# Patient Record
Sex: MALE | Race: WHITE | Employment: UNEMPLOYED | URBAN - METROPOLITAN AREA
[De-identification: names, ages, dates, MRNs, and addresses within clinical notes are randomized per-mention and may not be internally consistent; named-entity substitution may affect disease eponyms.]

---

## 2018-08-23 ENCOUNTER — HOSPITAL ENCOUNTER (OUTPATIENT)
Dept: GENERAL RADIOLOGY | Age: 58
Discharge: HOME | End: 2018-08-23
Payer: COMMERCIAL

## 2018-08-23 ENCOUNTER — HOSPITAL ENCOUNTER (OUTPATIENT)
Age: 58
Discharge: HOME | End: 2018-08-23
Payer: COMMERCIAL

## 2018-08-23 DIAGNOSIS — M25.551 HIP PAIN, BILATERAL: ICD-10-CM

## 2018-08-23 DIAGNOSIS — M54.42 LEFT-SIDED LOW BACK PAIN WITH LEFT-SIDED SCIATICA, UNSPECIFIED CHRONICITY: ICD-10-CM

## 2018-08-23 DIAGNOSIS — M25.552 HIP PAIN, BILATERAL: ICD-10-CM

## 2018-08-23 PROCEDURE — 73521 X-RAY EXAM HIPS BI 2 VIEWS: CPT

## 2018-08-23 PROCEDURE — 72100 X-RAY EXAM L-S SPINE 2/3 VWS: CPT

## 2020-06-30 ENCOUNTER — HOSPITAL ENCOUNTER (OUTPATIENT)
Age: 60
Discharge: HOME | End: 2020-06-30
Payer: COMMERCIAL

## 2020-06-30 ENCOUNTER — HOSPITAL ENCOUNTER (OUTPATIENT)
Dept: GENERAL RADIOLOGY | Age: 60
Discharge: HOME | End: 2020-06-30
Payer: COMMERCIAL

## 2020-06-30 PROCEDURE — 73501 X-RAY EXAM HIP UNI 1 VIEW: CPT

## 2021-01-06 ENCOUNTER — OFFICE VISIT (OUTPATIENT)
Dept: ORTHOPEDIC SURGERY | Age: 61
End: 2021-01-06
Payer: COMMERCIAL

## 2021-01-06 VITALS — WEIGHT: 191 LBS | HEIGHT: 70 IN | BODY MASS INDEX: 27.35 KG/M2

## 2021-01-06 DIAGNOSIS — M25.522 LEFT ELBOW PAIN: ICD-10-CM

## 2021-01-06 DIAGNOSIS — M75.22 BICEPS TENDINITIS OF LEFT UPPER EXTREMITY: Primary | ICD-10-CM

## 2021-01-06 PROCEDURE — 99203 OFFICE O/P NEW LOW 30 MIN: CPT | Performed by: ORTHOPAEDIC SURGERY

## 2021-01-06 RX ORDER — CYCLOBENZAPRINE HCL 10 MG
10 TABLET ORAL 3 TIMES DAILY PRN
Qty: 21 TABLET | Refills: 0 | Status: SHIPPED | OUTPATIENT
Start: 2021-01-06 | End: 2021-01-16

## 2021-01-06 RX ORDER — METHYLPREDNISOLONE 4 MG/1
TABLET ORAL
Qty: 1 KIT | Refills: 1 | Status: SHIPPED | OUTPATIENT
Start: 2021-01-06 | End: 2022-08-22

## 2021-01-06 NOTE — PROGRESS NOTES
[address] [facility]  Outpatient Progress Note  Dilip Inman MD    Patient Name: Abdiaziz Vick MRN: [id number]   Age: 64 y.o. YOB: 1960   Sex: male      [address] Complaint   Patient presents with    Arm Injury     NP LEFT BICEP/ARM INJURY: NEW XR        HISTORY OF PRESENT ILLNESS   Abdiaziz Vick is a 64 y.o. male resents the office today for evaluation of complaints of pain in his left arm. Patient states that he lives in a house that requires wood-burning for heat. He chops all of his own wood. He states that approximately 1 week ago he developed severe pain in the left elbow forearm and upper arm. He does not recall a specific injury. He has been unable to chop wood and has therefore not had heat in his house during that period of time. This is his first evaluation for this problem. He had no previous difficulty with the arm. He has not been taking any medication for this injury. PAST MEDICAL HISTORY    No past medical history on file. PAST SURGICAL HISTORY     Past Surgical History:   Procedure Laterality Date    HIP SURGERY         MEDICATIONS     Current Outpatient Medications   Medication Sig Dispense Refill    cyclobenzaprine (FLEXERIL) 10 MG tablet Take 1 tablet by mouth 3 times daily as needed for Muscle spasms 21 tablet 0    methylPREDNISolone (MEDROL, SADI,) 4 MG tablet Take by mouth. 1 kit 1    ibuprofen (IBU) 800 MG tablet Take 1 tablet by mouth every 8 hours as needed for Pain. 20 tablet 0     No current facility-administered medications for this visit. ALLERGIES   No Known Allergies    FAMILY HISTORY   No family history on file.     SOCIAL HISTORY     Social History     Socioeconomic History    Marital status:      Spouse name: Not on file    Number of children: Not on file    Years of education: Not on file    Highest education level: Not on file   Occupational History    Not on file   Social Needs    Financial resource strain: Not on file    Food insecurity     Worry: Not on file     Inability: Not on file    Transportation needs     Medical: Not on file     Non-medical: Not on file   Tobacco Use    Smoking status: Never Smoker   Substance and Sexual Activity    Alcohol use: No    Drug use: No    Sexual activity: Not Currently   Lifestyle    Physical activity     Days per week: Not on file     Minutes per session: Not on file    Stress: Not on file   Relationships    Social connections     Talks on phone: Not on file     Gets together: Not on file     Attends Sabianism service: Not on file     Active member of club or organization: Not on file     Attends meetings of clubs or organizations: Not on file     Relationship status: Not on file    Intimate partner violence     Fear of current or ex partner: Not on file     Emotionally abused: Not on file     Physically abused: Not on file     Forced sexual activity: Not on file   Other Topics Concern    Not on file   Social History Narrative    Not on file       REVIEW OF SYSTEMS   General: no fever, chills, night sweats, anorexia, malaise, fatigue, or weight change  Hematologic:  no unexplained bleeding or bruising  HEENT:   no nasal congestion, rhinorrhea, sore throat, or facial pain  Respiratory:  no cough, dyspnea, or chest pain  Cardiovascular:  no angina, RAMIREZ, PND, orthopnea, dependent edema, or palpitations  Gastrointestinal:  no nausea, vomiting, diarrhea, constipation, or abdominal pain  Genitourinary:  no urinary urgency, frequency, dysuria, or hematuria  Musculoskeletal: see HPI  Endocrine:  no heat or cold intolerance and no polyphagia, polydipsia, or polyuria  Skin:  no skin eruptions or changing lesions  Neurologic:  no focal weakness, numbness/tingling, tremor, or severe headache. See HPI. See HPI for pertinent positives.     PHYSICAL EXAM   Vital Signs: Ht 5' 10\" (1.778 m)   Wt 191 lb (86.6 kg)   BMI 27.41 kg/m²     General appearance: healthy, alert, no distress  Skin: Skin color, texture, turgor normal. No rashes or lesions  HEENT: atraumatic, normocephalic. PERRL  Respiratory: Unlabored breathing  Lymphatic: No adenopathy   Neuro: Alert and oriented, normal distal sensation, normal bilateral DTRs  Vascular: Normal distal capillary and distal pulses  Muskuloskeletal Exam:     Left Elbow Examination    Inspection: No deformity, atrophy, ecchymosis, swelling or visible scars. and The carrying angle is normal.    Palpation: There is diffuse tenderness to palpation in the upper extremity, biceps, biceps tendon, common extensor origin and forearm. Range of Motion: Limited secondary to pain. Pain demonstrated throughout range of motion. Range of motion measured at full extension to 120 degrees of flexion. Strength: Biceps rated: 3/5. Triceps rated: 5/5. Supination rated: 3/5. Pronation rated: 3/5. Special Tests: There is no specific ligament instability. There is no Tinel's sign over the ulnar nerve. All other testing demonstrates diffuse pain throughout range of motion or motor strength testing. Motor strength seems limited secondary to patient effort and pain. Additional Comments:     RADIOLOGY   X-rays obtained and reviewed in office:  Views left elbow 3 views    Impression: No bony abnormality    IMPRESSION     1. Biceps tendinitis of left upper extremity    2. Left elbow pain         PLAN   I had a lengthy discussion with patient today regarding diagnosis and treatment options and recommendations. I suspect the patient's symptoms are secondary to muscular strain in the upper arm and forearm. He does seem to have some biceps tendinitis and a component of lateral epicondylitis but none of his symptoms seem to localize and his pain is so severe and diffuse that I have recommended a period of immobilization in a long-arm posterior splint with the elbow in neutral position.   I am going to prescribe a Medrol Dosepak as well as a muscle relaxer to help ease his discomfort. I will plan to see him back in the office in 1 week for reevaluation. FOLLOWUP     Return in about 1 week (around 1/13/2021) for Reevaluation. Orders Placed This Encounter   Procedures    XR ELBOW LEFT (MIN 3 VIEWS)     Standing Status:   Future     Number of Occurrences:   1     Standing Expiration Date:   1/6/2022      Orders Placed This Encounter   Medications    cyclobenzaprine (FLEXERIL) 10 MG tablet     Sig: Take 1 tablet by mouth 3 times daily as needed for Muscle spasms     Dispense:  21 tablet     Refill:  0    methylPREDNISolone (MEDROL, SADI,) 4 MG tablet     Sig: Take by mouth.      Dispense:  1 kit     Refill:  1       Patient was instructed on appropriate use of braces, participation in home exercise programs, healthy lifestyle choices and weight loss as appropriate     Gabriela Bose MD

## 2021-01-13 ENCOUNTER — OFFICE VISIT (OUTPATIENT)
Dept: ORTHOPEDIC SURGERY | Age: 61
End: 2021-01-13
Payer: COMMERCIAL

## 2021-01-13 VITALS — BODY MASS INDEX: 27.35 KG/M2 | WEIGHT: 191 LBS | HEIGHT: 70 IN

## 2021-01-13 DIAGNOSIS — M75.22 BICEPS TENDINITIS OF LEFT UPPER EXTREMITY: Primary | ICD-10-CM

## 2021-01-13 PROCEDURE — 99213 OFFICE O/P EST LOW 20 MIN: CPT | Performed by: ORTHOPAEDIC SURGERY

## 2021-01-13 RX ORDER — IBUPROFEN 800 MG/1
800 TABLET ORAL
Qty: 90 TABLET | Refills: 0 | Status: SHIPPED | OUTPATIENT
Start: 2021-01-13

## 2021-01-13 RX ORDER — CYCLOBENZAPRINE HCL 10 MG
10 TABLET ORAL 3 TIMES DAILY PRN
Qty: 21 TABLET | Refills: 0 | Status: SHIPPED | OUTPATIENT
Start: 2021-01-13 | End: 2021-01-23

## 2021-01-13 NOTE — PROGRESS NOTES
Socioeconomic History    Marital status:      Spouse name: None    Number of children: None    Years of education: None    Highest education level: None   Occupational History    None   Social Needs    Financial resource strain: None    Food insecurity     Worry: None     Inability: None    Transportation needs     Medical: None     Non-medical: None   Tobacco Use    Smoking status: Never Smoker   Substance and Sexual Activity    Alcohol use: No    Drug use: No    Sexual activity: Not Currently   Lifestyle    Physical activity     Days per week: None     Minutes per session: None    Stress: None   Relationships    Social connections     Talks on phone: None     Gets together: None     Attends Hoahaoism service: None     Active member of club or organization: None     Attends meetings of clubs or organizations: None     Relationship status: None    Intimate partner violence     Fear of current or ex partner: None     Emotionally abused: None     Physically abused: None     Forced sexual activity: None   Other Topics Concern    None   Social History Narrative    None       REVIEW OF SYSTEMS   General: no fever, chills, night sweats, anorexia, malaise, fatigue, or weight change  Hematologic:  no unexplained bleeding or bruising  HEENT:   no nasal congestion, rhinorrhea, sore throat, or facial pain  Respiratory:  no cough, dyspnea, or chest pain  Cardiovascular:  no angina, RAMIREZ, PND, orthopnea, dependent edema, or palpitations  Gastrointestinal:  no nausea, vomiting, diarrhea, constipation, or abdominal pain  Genitourinary:  no urinary urgency, frequency, dysuria, or hematuria  Musculoskeletal: see HPI  Endocrine:  no heat or cold intolerance and no polyphagia, polydipsia, or polyuria  Skin:  no skin eruptions or changing lesions  Neurologic:  no focal weakness, numbness/tingling, tremor, or severe headache. See HPI. See HPI for pertinent positives.     PHYSICAL EXAM Vital Signs: Ht 5' 10\" (1.778 m)   Wt 191 lb (86.6 kg)   BMI 27.41 kg/m²     General appearance: healthy, alert, no distress  Skin: Skin color, texture, turgor normal. No rashes or lesions  HEENT: atraumatic, normocephalic. PERRL  Respiratory: Unlabored breathing  Lymphatic: No adenopathy   Neuro: Alert and oriented, normal distal sensation, normal bilateral DTRs  Vascular: Normal distal capillary and distal pulses  Muskuloskeletal Exam:   Left upper extremity has no swelling. There is mild discomfort still noted at the biceps tendon in the antecubital space. Pain is reproduced with resisted forearm supination. Range of motion of the elbow is full. No tenderness about the shoulder or cervical spine. Full range of motion of the shoulder with a negative impingement sign and normal strength in the rotator cuff is noted. IMPRESSION     1. Biceps tendinitis of left upper extremity           PLAN   Patient is improving in response to current treatment regimen although he still has some symptoms consistent with diagnosis of distal biceps tendinitis. He can continue with a long-arm posterior splint as needed for discomfort. He can continue with Flexeril sparingly at bedtime or throughout the day for severe pain and muscle spasm. I have sent a refill of this medication and also prescribed ibuprofen 800 mg. He was instructed on home range of motion and stretching program for the upper arm and elbow. He can increase activities as tolerated. He will return to the office if he does not continue to improve. FOLLOWUP     Return if symptoms worsen or fail to improve. No orders of the defined types were placed in this encounter.      Orders Placed This Encounter   Medications    cyclobenzaprine (FLEXERIL) 10 MG tablet     Sig: Take 1 tablet by mouth 3 times daily as needed for Muscle spasms     Dispense:  21 tablet     Refill:  0    ibuprofen (ADVIL;MOTRIN) 800 MG tablet Sig: Take 1 tablet by mouth 3 times daily (with meals)     Dispense:  90 tablet     Refill:  0       Patient was instructed on appropriate use of braces, participation in home exercise programs, healthy lifestyle choices and weight loss as appropriate     Kristopher Pabon MD

## 2022-03-23 ENCOUNTER — HOSPITAL ENCOUNTER (OUTPATIENT)
Dept: GENERAL RADIOLOGY | Age: 62
Discharge: HOME | End: 2022-03-23
Payer: COMMERCIAL

## 2022-03-23 ENCOUNTER — HOSPITAL ENCOUNTER (OUTPATIENT)
Age: 62
Discharge: HOME | End: 2022-03-23
Payer: COMMERCIAL

## 2022-03-23 DIAGNOSIS — M25.551 BILATERAL HIP PAIN: ICD-10-CM

## 2022-03-23 DIAGNOSIS — M25.552 BILATERAL HIP PAIN: ICD-10-CM

## 2022-03-23 PROCEDURE — 73522 X-RAY EXAM HIPS BI 3-4 VIEWS: CPT

## 2022-05-11 ENCOUNTER — HOSPITAL ENCOUNTER (EMERGENCY)
Age: 62
Discharge: HOME | End: 2022-05-11
Payer: COMMERCIAL

## 2022-05-11 VITALS
HEART RATE: 77 BPM | WEIGHT: 175 LBS | OXYGEN SATURATION: 97 % | TEMPERATURE: 97.8 F | SYSTOLIC BLOOD PRESSURE: 125 MMHG | BODY MASS INDEX: 24.5 KG/M2 | DIASTOLIC BLOOD PRESSURE: 79 MMHG | HEIGHT: 71 IN | RESPIRATION RATE: 16 BRPM

## 2022-05-11 DIAGNOSIS — M25.551 RIGHT HIP PAIN: Primary | ICD-10-CM

## 2022-05-11 PROCEDURE — 99283 EMERGENCY DEPT VISIT LOW MDM: CPT

## 2022-05-11 PROCEDURE — 6370000000 HC RX 637 (ALT 250 FOR IP): Performed by: REGISTERED NURSE

## 2022-05-11 RX ORDER — METHOCARBAMOL 500 MG/1
500 TABLET, FILM COATED ORAL 4 TIMES DAILY
Qty: 40 TABLET | Refills: 0 | Status: SHIPPED | OUTPATIENT
Start: 2022-05-11 | End: 2022-05-21

## 2022-05-11 RX ORDER — NAPROXEN 500 MG/1
500 TABLET ORAL 2 TIMES DAILY PRN
Qty: 20 TABLET | Refills: 0 | Status: ON HOLD | OUTPATIENT
Start: 2022-05-11 | End: 2022-09-09 | Stop reason: HOSPADM

## 2022-05-11 RX ORDER — GABAPENTIN 600 MG/1
600 TABLET ORAL 4 TIMES DAILY
COMMUNITY

## 2022-05-11 RX ORDER — METHOCARBAMOL 500 MG/1
1000 TABLET, FILM COATED ORAL ONCE
Status: COMPLETED | OUTPATIENT
Start: 2022-05-11 | End: 2022-05-11

## 2022-05-11 RX ORDER — NAPROXEN 500 MG/1
500 TABLET ORAL ONCE
Status: COMPLETED | OUTPATIENT
Start: 2022-05-11 | End: 2022-05-11

## 2022-05-11 RX ADMIN — METHOCARBAMOL TABLETS 1000 MG: 500 TABLET, COATED ORAL at 11:16

## 2022-05-11 RX ADMIN — NAPROXEN 500 MG: 500 TABLET ORAL at 11:17

## 2022-05-11 ASSESSMENT — PAIN DESCRIPTION - LOCATION
LOCATION: LEG
LOCATION: ABDOMEN;GROIN
LOCATION: LEG

## 2022-05-11 ASSESSMENT — PAIN DESCRIPTION - ORIENTATION
ORIENTATION: RIGHT

## 2022-05-11 ASSESSMENT — ENCOUNTER SYMPTOMS
DIARRHEA: 0
BACK PAIN: 0
ABDOMINAL PAIN: 0
CONSTIPATION: 0
VOMITING: 0
NAUSEA: 0
COUGH: 0
SORE THROAT: 0
SHORTNESS OF BREATH: 0
RHINORRHEA: 0

## 2022-05-11 ASSESSMENT — PAIN SCALES - GENERAL
PAINLEVEL_OUTOF10: 7
PAINLEVEL_OUTOF10: 8
PAINLEVEL_OUTOF10: 8

## 2022-05-11 ASSESSMENT — PAIN DESCRIPTION - PAIN TYPE: TYPE: CHRONIC PAIN

## 2022-05-11 ASSESSMENT — PAIN DESCRIPTION - DESCRIPTORS: DESCRIPTORS: SHARP;STABBING

## 2022-05-11 ASSESSMENT — PAIN DESCRIPTION - FREQUENCY: FREQUENCY: CONTINUOUS

## 2022-05-11 NOTE — ED PROVIDER NOTES
[address] [facility]  EMERGENCY DEPARTMENT ENCOUNTER        Pt Name: Zeenat Land  MRN: [id number]  [place] 1960  Date of evaluation: 5/11/2022  Provider: DEDE Vieira - ISABELL  PCP: Sarah Wallis    This patient was not seen and evaluated by the attending physician. I have evaluated this patient. My supervising physician was available for consultation. CHIEF COMPLAINT       Chief Complaint   Patient presents with    Groin Pain     Pt. states he woke up and his pain has increased. Pt. states he's had pain in this area for about 25 years. Pt. states he has some swelling on his right side. HISTORY OF PRESENT ILLNESS   (Location/Symptom, Timing/Onset, Context/Setting, Quality, Duration, Modifying Factors, Severity)  Note limiting factors. Zeenat Land is a 63 y.o. male who presents via car for evaluation of right-sided hip pain. Onset was years ago however has been worse over the last 3 days. Duration has been several years however worse over the last 3 days. Context includes patient reports he has a long history of right-sided hip pain. He states that he has been helping with a yard sale over the last 3 days and has been moving a lot of furniture and states since he has been doing this he has noticed his pain has increased with movement. He denies any pain to his penis or testicles and denies any difficulty with urination. He denies any chest pain, shortness of breath or abdominal pain. He denies any difficulties with ambulation however states the pain is worse after he has been up walking around all day. He denies any falls or injuries to this hip recently. . Quality is having with radiation to his right leg. Alleviating factors include rest, immobilization. Aggravating factors include meant and palpation. Pain is 7/10. Nothing has been used for pain today. Chart review reveals pt has no significant past medical history.   He takes gabapentin and aspirin. Nursing Notes were all reviewed and agreed with or any disagreements were addressed  in the HPI. Pt was seen during the [place] 19 pandemic. Appropriate PPE worn by ME during patient encounters. Pt seen during a time with constrained hospital bed capacity and other potential inpatient and outpatient resources were constrained due to the viral pandemic. REVIEW OF SYSTEMS    (2-9 systems for level 4, 10 or more for level 5)     Review of Systems   Constitutional: Negative for chills, diaphoresis and fever. HENT: Negative for congestion, rhinorrhea and sore throat. Respiratory: Negative for cough and shortness of breath. Cardiovascular: Negative for chest pain and leg swelling. Gastrointestinal: Negative for abdominal pain, constipation, diarrhea, nausea and vomiting. Genitourinary: Negative for decreased urine volume, dysuria, flank pain, frequency, hematuria, penile pain, penile swelling, scrotal swelling, testicular pain and urgency. Musculoskeletal: Positive for arthralgias. Negative for back pain and neck pain. Right hip pain   Skin: Negative for rash and wound. Positives and Pertinent negatives as per HPI. Except as noted abovein the ROS, all other systems were reviewed and negative. PAST MEDICAL HISTORY   No past medical history on file. SURGICAL HISTORY     Past Surgical History:   Procedure Laterality Date    HIP SURGERY           CURRENTMEDICATIONS       Previous Medications    IBUPROFEN (ADVIL;MOTRIN) 800 MG TABLET    Take 1 tablet by mouth 3 times daily (with meals)    METHYLPREDNISOLONE (MEDROL, SADI,) 4 MG TABLET    Take by mouth. ALLERGIES     Patient has no known allergies. FAMILYHISTORY     No family history on file.        SOCIAL HISTORY       Social History     Socioeconomic History    Marital status:      Spouse name: Not on file    Number of children: Not on file    Years of education: Not on file    Highest education level: Not on file   Occupational History    Not on file   Tobacco Use    Smoking status: Light Tobacco Smoker     Types: Cigars    Smokeless tobacco: Never Used   Substance and Sexual Activity    Alcohol use: No    Drug use: No    Sexual activity: Not Currently   Other Topics Concern    Not on file   Social History Narrative    Not on file     Social Determinants of Health     Financial Resource Strain:     Difficulty of Paying Living Expenses: Not on file   Food Insecurity:     Worried About Running Out of Food in the Last Year: Not on file    Anatoliy of Food in the Last Year: Not on file   Transportation Needs:     Lack of Transportation (Medical): Not on file    Lack of Transportation (Non-Medical): Not on file   Physical Activity:     Days of Exercise per Week: Not on file    Minutes of Exercise per Session: Not on file   Stress:     Feeling of Stress : Not on file   Social Connections:     Frequency of Communication with Friends and Family: Not on file    Frequency of Social Gatherings with Friends and Family: Not on file    Attends Scientology Services: Not on file    Active Member of [address] [place] [address] or Organizations: Not on file    Attends Club or Organization Meetings: Not on file    Marital Status: Not on file   Intimate Partner Violence:     Fear of Current or Ex-Partner: Not on file    Emotionally Abused: Not on file    Physically Abused: Not on file    Sexually Abused: Not on file   Housing Stability:     Unable to Pay for Housing in the Last Year: Not on file    Number of [place] in the Last Year: Not on file    Unstable Housing in the Last Year: Not on file       SCREENINGS             PHYSICAL EXAM    (up to 7 for level 4, 8 or more for level 5)     ED Triage Vitals [05/11/22 1015]   BP Temp Temp Source Pulse Resp SpO2 Height Weight   127/89 97.8 °F (36.6 °C) Tympanic 74 18 97 % 5' 10.5\" (1.791 m) 175 lb (79.4 kg)       Physical Exam  Vitals and nursing note reviewed.  Exam conducted with a chaperone present. Constitutional:       Appearance: Normal appearance. He is not ill-appearing or diaphoretic. HENT:      Head: Normocephalic and atraumatic. Right Ear: External ear normal.      Left Ear: External ear normal.   Eyes:      General:         Right eye: No discharge. Left eye: No discharge. Pulmonary:      Effort: Pulmonary effort is normal. No respiratory distress. Abdominal:      General: Abdomen is flat. Bowel sounds are normal.      Palpations: Abdomen is soft. Tenderness: There is no abdominal tenderness. There is no right CVA tenderness, left CVA tenderness or guarding. Hernia: There is no hernia in the left inguinal area or right inguinal area. Genitourinary:     Pubic Area: No rash. Penis: Normal and circumcised. No erythema, discharge, swelling or lesions. Testes: Normal. Cremasteric reflex is present. Musculoskeletal:         General: Tenderness present. Normal range of motion. Cervical back: Normal, normal range of motion and neck supple. Thoracic back: Normal.      Lumbar back: Normal.      Right hip: Tenderness present. No deformity, lacerations, bony tenderness or crepitus. Normal range of motion. Normal strength. Legs:       Comments: Neurovascularly intact distal to the right hip as evidenced by 2+ pedal pulse, 2+ posterior tibialis pulse, equal strength, sensation bilaterally. Patient is able to demonstrate full range of motion of the right hip   Lymphadenopathy:      Lower Body: No right inguinal adenopathy. No left inguinal adenopathy. Skin:     General: Skin is warm and dry. Capillary Refill: Capillary refill takes less than 2 seconds. Neurological:      General: No focal deficit present. Mental Status: He is alert and oriented to person, place, and time.    Psychiatric:         Mood and Affect: Mood normal.         Behavior: Behavior normal.       PHYSICAL EXAM  /89   Pulse 74 Temp 97.8 °F (36.6 °C) (Tympanic)   Resp 18   Ht 5' 10.5\" (1.791 m)   Wt 175 lb (79.4 kg)   SpO2 97%   BMI 24.75 kg/m²       DIAGNOSTIC RESULTS   LABS:    Labs Reviewed - No data to display    All other labs were within normal range or not returned as of this dictation. EKG: All EKG's are interpreted by the Emergency Department Physician who either signs orCo-signs this chart in the absence of a cardiologist.  Please see their note for interpretation of EKG. RADIOLOGY:   Non-plain film images such as CT, Ultrasound and MRI are read by the radiologist. Plain radiographic images are visualized andpreliminarily interpreted by the  ED Provider with the below findings:        Interpretation perthe Radiologist below, if available at the time of this note:    No orders to display     No results found. PROCEDURES   Unless otherwise noted below, none     Procedures    CRITICAL CARE TIME   N/A    CONSULTS:  None      EMERGENCY DEPARTMENT COURSE and DIFFERENTIALDIAGNOSIS/MDM:   Vitals:    Vitals:    05/11/22 1015   BP: 127/89   Pulse: 74   Resp: 18   Temp: 97.8 °F (36.6 °C)   TempSrc: Tympanic   SpO2: 97%   Weight: 175 lb (79.4 kg)   Height: 5' 10.5\" (1.791 m)       Patient was given thefollowing medications:  Medications   methocarbamol (ROBAXIN) tablet 1,000 mg (has no administration in time range)   naproxen (NAPROSYN) tablet 500 mg (has no administration in time range)       PDMP Monitoring:    Last PDMP Dipti Brito as Reviewed [facility]):  Review User Review Instant Review Result            Urine Drug Screenings (1 yr)     Drug screen multi urine  Collected: 10/22/2012 12:05 AM (Final result)   Narrative: This method is a screening test to detect only these drug classes as  part of a medical workup. Confirmatory testing by another method should  be ordered if clinically indicated.      Complete Results              Medication Contract and Consent for Opioid Use Documents Filed      No documents found MDM:   This patient was seen and evaluated by myself. He presents to the emergency department today for evaluation of right-sided hip pain. On exam he is alert and oriented hemodynamically stable and nontoxic in appearance. He states that he has had this pain for quite some time up to 25 years. He states it has been worse over the last 3 days as he was helping with a yard sale and has been lifting and moving things and has noticed increasing pain with range of motion. He denies any abdominal pain or urinary symptoms. On my exam I did not palpate any hernias or bony abnormalities however he did have palpable tenderness to the generalized anterior aspect of the right hip without obvious bony step-offs or abnormalities. No imaging collected at this time as the patient denies any specific injury and is able to demonstrate full range of motion. I discussed a plan for discharge with the patient including muscle relaxers, anti-inflammatories and use of lidocaine gel at home. I instructed him I felt it was important that he follow-up with orthopedics for further evaluation of this pain and possible MRI imaging. The patient stated he took Zanaflex previously however did not like it because it made him very sleepy. He was given a dose of Robaxin in the emergency department and discharged with a prescription for Robaxin and anti-inflammatories and instructed on their use. I gave him strict follow-up precautions for the emergency department including but not limited to worsening pain, limits in mobility or range of motion, penis or testicle pain, difficulty with urination or abdominal pain. The patient verbalized understanding of all discharge teaching he was ultimately discharged in a stable condition with all questions answered.       Discharge Time out:  CC Reviewed Yes   Test Results Yes     Vitals:    05/11/22 1015   BP: 127/89   Pulse: 74   Resp: 18   Temp: 97.8 °F (36.6 °C)   SpO2: 97% FINAL IMPRESSION      1. Right hip pain          DISPOSITION/PLAN   DISPOSITION Decision To Discharge 05/11/2022 10:54:53 AM      PATIENT REFERREDTO:  Cristina Singh  [address] [place] [address]  [phone]      As needed, If symptoms worsen    [facility] (PRISCILA) [facility]  [address] [facility]  [phone]    As needed, If symptoms worsen    Nick Ruiz MD  [address] [facility] [address]  [phone]      Re-evaluation of your right hip pain      DISCHARGE MEDICATIONS:  New Prescriptions    METHOCARBAMOL (ROBAXIN) 500 MG TABLET    Take 1 tablet by mouth 4 times daily for 10 days    NAPROXEN (NAPROSYN) 500 MG TABLET    Take 1 tablet by mouth 2 times daily as needed for Pain       DISCONTINUED MEDICATIONS:  Discontinued Medications    IBUPROFEN (IBU) 800 MG TABLET    Take 1 tablet by mouth every 8 hours as needed for Pain.               (Please note that portions ofthis note were completed with a voice recognition program.  Efforts were made to edit the dictations but occasionally words are mis-transcribed.)    DEDE Morrison CNP (electronically signed)       DEDE Morrison CNP  05/11/22 1100

## 2022-08-22 ENCOUNTER — APPOINTMENT (OUTPATIENT)
Dept: GENERAL RADIOLOGY | Age: 62
DRG: 364 | End: 2022-08-22
Payer: COMMERCIAL

## 2022-08-22 ENCOUNTER — HOSPITAL ENCOUNTER (INPATIENT)
Age: 62
LOS: 5 days | Discharge: LEFT BEFORE DISCHARGE | DRG: 364 | End: 2022-08-27
Attending: EMERGENCY MEDICINE | Admitting: INTERNAL MEDICINE
Payer: COMMERCIAL

## 2022-08-22 DIAGNOSIS — L02.413 ABSCESS OF RIGHT ELBOW: ICD-10-CM

## 2022-08-22 DIAGNOSIS — L03.113 CELLULITIS OF RIGHT UPPER EXTREMITY: Primary | ICD-10-CM

## 2022-08-22 LAB
A/G RATIO: 1.4 (ref 1.1–2.2)
ALBUMIN SERPL-MCNC: 3.9 G/DL (ref 3.4–5)
ALP BLD-CCNC: 104 U/L (ref 40–129)
ALT SERPL-CCNC: 16 U/L (ref 10–40)
ANION GAP SERPL CALCULATED.3IONS-SCNC: 10 MMOL/L (ref 3–16)
AST SERPL-CCNC: 16 U/L (ref 15–37)
BASOPHILS ABSOLUTE: 0.1 K/UL (ref 0–0.2)
BASOPHILS RELATIVE PERCENT: 0.5 %
BILIRUB SERPL-MCNC: 0.3 MG/DL (ref 0–1)
BUN BLDV-MCNC: 16 MG/DL (ref 7–20)
C-REACTIVE PROTEIN: 142.2 MG/L (ref 0–5.1)
CALCIUM SERPL-MCNC: 9.1 MG/DL (ref 8.3–10.6)
CHLORIDE BLD-SCNC: 100 MMOL/L (ref 99–110)
CO2: 26 MMOL/L (ref 21–32)
CREAT SERPL-MCNC: 0.9 MG/DL (ref 0.8–1.3)
EOSINOPHILS ABSOLUTE: 0.2 K/UL (ref 0–0.6)
EOSINOPHILS RELATIVE PERCENT: 1.9 %
GFR AFRICAN AMERICAN: >60
GFR NON-AFRICAN AMERICAN: >60
GLUCOSE BLD-MCNC: 100 MG/DL (ref 70–99)
HCT VFR BLD CALC: 42.6 % (ref 40.5–52.5)
HEMOGLOBIN: 14.5 G/DL (ref 13.5–17.5)
LACTIC ACID: 0.9 MMOL/L (ref 0.4–2)
LYMPHOCYTES ABSOLUTE: 1.1 K/UL (ref 1–5.1)
LYMPHOCYTES RELATIVE PERCENT: 9.7 %
MCH RBC QN AUTO: 31.8 PG (ref 26–34)
MCHC RBC AUTO-ENTMCNC: 34 G/DL (ref 31–36)
MCV RBC AUTO: 93.6 FL (ref 80–100)
MONOCYTES ABSOLUTE: 0.7 K/UL (ref 0–1.3)
MONOCYTES RELATIVE PERCENT: 6.3 %
NEUTROPHILS ABSOLUTE: 9 K/UL (ref 1.7–7.7)
NEUTROPHILS RELATIVE PERCENT: 81.6 %
PDW BLD-RTO: 14.6 % (ref 12.4–15.4)
PLATELET # BLD: 189 K/UL (ref 135–450)
PMV BLD AUTO: 7.7 FL (ref 5–10.5)
POTASSIUM SERPL-SCNC: 3.8 MMOL/L (ref 3.5–5.1)
RBC # BLD: 4.55 M/UL (ref 4.2–5.9)
SARS-COV-2, NAAT: NOT DETECTED
SEDIMENTATION RATE, ERYTHROCYTE: 34 MM/HR (ref 0–20)
SODIUM BLD-SCNC: 136 MMOL/L (ref 136–145)
TOTAL PROTEIN: 6.7 G/DL (ref 6.4–8.2)
WBC # BLD: 11.1 K/UL (ref 4–11)

## 2022-08-22 PROCEDURE — 85652 RBC SED RATE AUTOMATED: CPT

## 2022-08-22 PROCEDURE — 87635 SARS-COV-2 COVID-19 AMP PRB: CPT

## 2022-08-22 PROCEDURE — 87040 BLOOD CULTURE FOR BACTERIA: CPT

## 2022-08-22 PROCEDURE — 86140 C-REACTIVE PROTEIN: CPT

## 2022-08-22 PROCEDURE — 99285 EMERGENCY DEPT VISIT HI MDM: CPT

## 2022-08-22 PROCEDURE — 96365 THER/PROPH/DIAG IV INF INIT: CPT

## 2022-08-22 PROCEDURE — 2580000003 HC RX 258: Performed by: NURSE PRACTITIONER

## 2022-08-22 PROCEDURE — 6370000000 HC RX 637 (ALT 250 FOR IP)

## 2022-08-22 PROCEDURE — 96367 TX/PROPH/DG ADDL SEQ IV INF: CPT

## 2022-08-22 PROCEDURE — 80053 COMPREHEN METABOLIC PANEL: CPT

## 2022-08-22 PROCEDURE — 6360000002 HC RX W HCPCS: Performed by: NURSE PRACTITIONER

## 2022-08-22 PROCEDURE — 73080 X-RAY EXAM OF ELBOW: CPT

## 2022-08-22 PROCEDURE — 96375 TX/PRO/DX INJ NEW DRUG ADDON: CPT

## 2022-08-22 PROCEDURE — 85025 COMPLETE CBC W/AUTO DIFF WBC: CPT

## 2022-08-22 PROCEDURE — 36415 COLL VENOUS BLD VENIPUNCTURE: CPT

## 2022-08-22 PROCEDURE — 1200000000 HC SEMI PRIVATE

## 2022-08-22 PROCEDURE — 6360000002 HC RX W HCPCS: Performed by: EMERGENCY MEDICINE

## 2022-08-22 PROCEDURE — 83605 ASSAY OF LACTIC ACID: CPT

## 2022-08-22 RX ORDER — OXYCODONE HYDROCHLORIDE AND ACETAMINOPHEN 5; 325 MG/1; MG/1
1 TABLET ORAL EVERY 6 HOURS PRN
Status: DISCONTINUED | OUTPATIENT
Start: 2022-08-22 | End: 2022-08-27 | Stop reason: HOSPADM

## 2022-08-22 RX ORDER — OXYCODONE HYDROCHLORIDE AND ACETAMINOPHEN 5; 325 MG/1; MG/1
2 TABLET ORAL EVERY 6 HOURS PRN
Status: DISCONTINUED | OUTPATIENT
Start: 2022-08-22 | End: 2022-08-27 | Stop reason: HOSPADM

## 2022-08-22 RX ORDER — MORPHINE SULFATE 4 MG/ML
4 INJECTION, SOLUTION INTRAMUSCULAR; INTRAVENOUS ONCE
Status: COMPLETED | OUTPATIENT
Start: 2022-08-22 | End: 2022-08-22

## 2022-08-22 RX ORDER — OXYCODONE HYDROCHLORIDE AND ACETAMINOPHEN 5; 325 MG/1; MG/1
TABLET ORAL
Status: COMPLETED
Start: 2022-08-22 | End: 2022-08-22

## 2022-08-22 RX ADMIN — OXYCODONE HYDROCHLORIDE AND ACETAMINOPHEN 2 TABLET: 5; 325 TABLET ORAL at 23:35

## 2022-08-22 RX ADMIN — MORPHINE SULFATE 4 MG: 4 INJECTION, SOLUTION INTRAMUSCULAR; INTRAVENOUS at 21:36

## 2022-08-22 RX ADMIN — CEFEPIME 2000 MG: 2 INJECTION, POWDER, FOR SOLUTION INTRAVENOUS at 21:26

## 2022-08-22 RX ADMIN — VANCOMYCIN HYDROCHLORIDE 2000 MG: 10 INJECTION, POWDER, LYOPHILIZED, FOR SOLUTION INTRAVENOUS at 22:17

## 2022-08-22 ASSESSMENT — PAIN DESCRIPTION - LOCATION
LOCATION: ARM

## 2022-08-22 ASSESSMENT — ENCOUNTER SYMPTOMS
SHORTNESS OF BREATH: 0
COUGH: 0
DIARRHEA: 0
VOMITING: 0
NAUSEA: 0
EYE PAIN: 0
BACK PAIN: 0
SORE THROAT: 0
BLOOD IN STOOL: 0
RHINORRHEA: 0
ABDOMINAL PAIN: 0

## 2022-08-22 ASSESSMENT — PAIN SCALES - GENERAL
PAINLEVEL_OUTOF10: 8
PAINLEVEL_OUTOF10: 7
PAINLEVEL_OUTOF10: 9
PAINLEVEL_OUTOF10: 7

## 2022-08-22 ASSESSMENT — PAIN DESCRIPTION - DESCRIPTORS
DESCRIPTORS: THROBBING
DESCRIPTORS: THROBBING

## 2022-08-22 ASSESSMENT — PAIN DESCRIPTION - ORIENTATION
ORIENTATION: RIGHT

## 2022-08-22 ASSESSMENT — PAIN - FUNCTIONAL ASSESSMENT: PAIN_FUNCTIONAL_ASSESSMENT: 0-10

## 2022-08-22 ASSESSMENT — PAIN DESCRIPTION - PAIN TYPE: TYPE: ACUTE PAIN

## 2022-08-23 LAB
ANION GAP SERPL CALCULATED.3IONS-SCNC: 10 MMOL/L (ref 3–16)
BASOPHILS ABSOLUTE: 0 K/UL (ref 0–0.2)
BASOPHILS RELATIVE PERCENT: 0.4 %
BUN BLDV-MCNC: 14 MG/DL (ref 7–20)
CALCIUM SERPL-MCNC: 8.6 MG/DL (ref 8.3–10.6)
CHLORIDE BLD-SCNC: 104 MMOL/L (ref 99–110)
CO2: 22 MMOL/L (ref 21–32)
CREAT SERPL-MCNC: 1 MG/DL (ref 0.8–1.3)
EOSINOPHILS ABSOLUTE: 0.2 K/UL (ref 0–0.6)
EOSINOPHILS RELATIVE PERCENT: 1.8 %
GFR AFRICAN AMERICAN: >60
GFR NON-AFRICAN AMERICAN: >60
GLUCOSE BLD-MCNC: 103 MG/DL (ref 70–99)
HCT VFR BLD CALC: 43.1 % (ref 40.5–52.5)
HEMOGLOBIN: 14.1 G/DL (ref 13.5–17.5)
LYMPHOCYTES ABSOLUTE: 1.2 K/UL (ref 1–5.1)
LYMPHOCYTES RELATIVE PERCENT: 12.5 %
MCH RBC QN AUTO: 31.5 PG (ref 26–34)
MCHC RBC AUTO-ENTMCNC: 32.7 G/DL (ref 31–36)
MCV RBC AUTO: 96.3 FL (ref 80–100)
MONOCYTES ABSOLUTE: 0.7 K/UL (ref 0–1.3)
MONOCYTES RELATIVE PERCENT: 7.4 %
NEUTROPHILS ABSOLUTE: 7.4 K/UL (ref 1.7–7.7)
NEUTROPHILS RELATIVE PERCENT: 77.9 %
PDW BLD-RTO: 14.9 % (ref 12.4–15.4)
PLATELET # BLD: 164 K/UL (ref 135–450)
PMV BLD AUTO: 7.9 FL (ref 5–10.5)
POTASSIUM REFLEX MAGNESIUM: 3.7 MMOL/L (ref 3.5–5.1)
RBC # BLD: 4.47 M/UL (ref 4.2–5.9)
SODIUM BLD-SCNC: 136 MMOL/L (ref 136–145)
WBC # BLD: 9.5 K/UL (ref 4–11)

## 2022-08-23 PROCEDURE — 2580000003 HC RX 258: Performed by: INTERNAL MEDICINE

## 2022-08-23 PROCEDURE — 99225 PR SBSQ OBSERVATION CARE/DAY 25 MINUTES: CPT | Performed by: INTERNAL MEDICINE

## 2022-08-23 PROCEDURE — 6360000002 HC RX W HCPCS: Performed by: INTERNAL MEDICINE

## 2022-08-23 PROCEDURE — 36415 COLL VENOUS BLD VENIPUNCTURE: CPT

## 2022-08-23 PROCEDURE — 6370000000 HC RX 637 (ALT 250 FOR IP): Performed by: INTERNAL MEDICINE

## 2022-08-23 PROCEDURE — 1200000000 HC SEMI PRIVATE

## 2022-08-23 PROCEDURE — 80048 BASIC METABOLIC PNL TOTAL CA: CPT

## 2022-08-23 PROCEDURE — 85025 COMPLETE CBC W/AUTO DIFF WBC: CPT

## 2022-08-23 RX ORDER — SODIUM CHLORIDE 0.9 % (FLUSH) 0.9 %
5-40 SYRINGE (ML) INJECTION EVERY 12 HOURS SCHEDULED
Status: DISCONTINUED | OUTPATIENT
Start: 2022-08-23 | End: 2022-08-27 | Stop reason: SDUPTHER

## 2022-08-23 RX ORDER — SODIUM CHLORIDE 9 MG/ML
INJECTION, SOLUTION INTRAVENOUS CONTINUOUS
Status: DISCONTINUED | OUTPATIENT
Start: 2022-08-23 | End: 2022-08-23

## 2022-08-23 RX ORDER — MAGNESIUM SULFATE IN WATER 40 MG/ML
2000 INJECTION, SOLUTION INTRAVENOUS PRN
Status: DISCONTINUED | OUTPATIENT
Start: 2022-08-23 | End: 2022-08-27 | Stop reason: HOSPADM

## 2022-08-23 RX ORDER — SODIUM CHLORIDE 0.9 % (FLUSH) 0.9 %
5-40 SYRINGE (ML) INJECTION PRN
Status: DISCONTINUED | OUTPATIENT
Start: 2022-08-23 | End: 2022-08-27 | Stop reason: SDUPTHER

## 2022-08-23 RX ORDER — POTASSIUM CHLORIDE 20 MEQ/1
40 TABLET, EXTENDED RELEASE ORAL PRN
Status: DISCONTINUED | OUTPATIENT
Start: 2022-08-23 | End: 2022-08-27 | Stop reason: HOSPADM

## 2022-08-23 RX ORDER — ONDANSETRON 4 MG/1
4 TABLET, ORALLY DISINTEGRATING ORAL EVERY 8 HOURS PRN
Status: DISCONTINUED | OUTPATIENT
Start: 2022-08-23 | End: 2022-08-26 | Stop reason: SDUPTHER

## 2022-08-23 RX ORDER — GABAPENTIN 300 MG/1
600 CAPSULE ORAL 4 TIMES DAILY
Status: DISCONTINUED | OUTPATIENT
Start: 2022-08-23 | End: 2022-08-27 | Stop reason: HOSPADM

## 2022-08-23 RX ORDER — ACETAMINOPHEN 325 MG/1
650 TABLET ORAL EVERY 6 HOURS PRN
Status: DISCONTINUED | OUTPATIENT
Start: 2022-08-23 | End: 2022-08-27 | Stop reason: HOSPADM

## 2022-08-23 RX ORDER — POTASSIUM CHLORIDE 7.45 MG/ML
10 INJECTION INTRAVENOUS PRN
Status: DISCONTINUED | OUTPATIENT
Start: 2022-08-23 | End: 2022-08-27 | Stop reason: HOSPADM

## 2022-08-23 RX ORDER — POLYETHYLENE GLYCOL 3350 17 G/17G
17 POWDER, FOR SOLUTION ORAL DAILY PRN
Status: DISCONTINUED | OUTPATIENT
Start: 2022-08-23 | End: 2022-08-27 | Stop reason: HOSPADM

## 2022-08-23 RX ORDER — ONDANSETRON 2 MG/ML
4 INJECTION INTRAMUSCULAR; INTRAVENOUS EVERY 6 HOURS PRN
Status: DISCONTINUED | OUTPATIENT
Start: 2022-08-23 | End: 2022-08-26 | Stop reason: SDUPTHER

## 2022-08-23 RX ORDER — ENOXAPARIN SODIUM 100 MG/ML
40 INJECTION SUBCUTANEOUS DAILY
Status: DISCONTINUED | OUTPATIENT
Start: 2022-08-23 | End: 2022-08-26

## 2022-08-23 RX ORDER — SODIUM CHLORIDE 9 MG/ML
INJECTION, SOLUTION INTRAVENOUS PRN
Status: DISCONTINUED | OUTPATIENT
Start: 2022-08-23 | End: 2022-08-27 | Stop reason: HOSPADM

## 2022-08-23 RX ORDER — ACETAMINOPHEN 650 MG/1
650 SUPPOSITORY RECTAL EVERY 6 HOURS PRN
Status: DISCONTINUED | OUTPATIENT
Start: 2022-08-23 | End: 2022-08-27 | Stop reason: HOSPADM

## 2022-08-23 RX ADMIN — CEFEPIME 2000 MG: 2 INJECTION, POWDER, FOR SOLUTION INTRAVENOUS at 06:18

## 2022-08-23 RX ADMIN — SODIUM CHLORIDE: 9 INJECTION, SOLUTION INTRAVENOUS at 01:11

## 2022-08-23 RX ADMIN — GABAPENTIN 600 MG: 300 CAPSULE ORAL at 14:02

## 2022-08-23 RX ADMIN — GABAPENTIN 600 MG: 300 CAPSULE ORAL at 17:29

## 2022-08-23 RX ADMIN — GABAPENTIN 600 MG: 300 CAPSULE ORAL at 22:25

## 2022-08-23 RX ADMIN — ENOXAPARIN SODIUM 40 MG: 100 INJECTION SUBCUTANEOUS at 09:21

## 2022-08-23 RX ADMIN — CEFEPIME 2000 MG: 2 INJECTION, POWDER, FOR SOLUTION INTRAVENOUS at 18:50

## 2022-08-23 RX ADMIN — Medication 10 ML: at 22:26

## 2022-08-23 RX ADMIN — ACETAMINOPHEN 650 MG: 325 TABLET ORAL at 22:25

## 2022-08-23 RX ADMIN — GABAPENTIN 600 MG: 300 CAPSULE ORAL at 09:20

## 2022-08-23 RX ADMIN — GABAPENTIN 600 MG: 300 CAPSULE ORAL at 01:13

## 2022-08-23 RX ADMIN — OXYCODONE HYDROCHLORIDE AND ACETAMINOPHEN 2 TABLET: 5; 325 TABLET ORAL at 12:33

## 2022-08-23 RX ADMIN — OXYCODONE HYDROCHLORIDE AND ACETAMINOPHEN 2 TABLET: 5; 325 TABLET ORAL at 06:15

## 2022-08-23 RX ADMIN — OXYCODONE HYDROCHLORIDE AND ACETAMINOPHEN 2 TABLET: 5; 325 TABLET ORAL at 18:48

## 2022-08-23 RX ADMIN — VANCOMYCIN HYDROCHLORIDE 1750 MG: 10 INJECTION, POWDER, LYOPHILIZED, FOR SOLUTION INTRAVENOUS at 23:37

## 2022-08-23 ASSESSMENT — PAIN SCALES - GENERAL
PAINLEVEL_OUTOF10: 8
PAINLEVEL_OUTOF10: 5
PAINLEVEL_OUTOF10: 8
PAINLEVEL_OUTOF10: 8
PAINLEVEL_OUTOF10: 6

## 2022-08-23 ASSESSMENT — PAIN DESCRIPTION - ORIENTATION
ORIENTATION: RIGHT

## 2022-08-23 ASSESSMENT — PAIN DESCRIPTION - LOCATION
LOCATION: ARM
LOCATION: ARM;SHOULDER
LOCATION: ARM
LOCATION: ARM;SHOULDER

## 2022-08-23 ASSESSMENT — PAIN DESCRIPTION - DESCRIPTORS
DESCRIPTORS: SHARP
DESCRIPTORS: THROBBING
DESCRIPTORS: SHARP

## 2022-08-23 NOTE — PLAN OF CARE
Problem: Discharge Planning  Goal: Discharge to home or other facility with appropriate resources  Outcome: Progressing  Flowsheets (Taken 8/23/2022 0015)  Discharge to home or other facility with appropriate resources:   Identify barriers to discharge with patient and caregiver   Arrange for needed discharge resources and transportation as appropriate   Identify discharge learning needs (meds, wound care, etc)   Arrange for interpreters to assist at discharge as needed   Refer to discharge planning if patient needs post-hospital services based on physician order or complex needs related to functional status, cognitive ability or social support system     Problem: Pain  Goal: Verbalizes/displays adequate comfort level or baseline comfort level  Outcome: Progressing

## 2022-08-23 NOTE — ACP (ADVANCE CARE PLANNING)
Advance Care Planning     General Advance Care Planning (ACP) Conversation    Date of Conversation: 8/22/2022  Conducted with: Patient with [address]:  Inquired with pt if he has an emergency contact as no one is listed in epic. Pt stated he doesn't have any contacts to list. He did state that he has a father who is 80 and sister but is not involved with his sister.      Content/Action Overview:  DECLINED ACP Conversation - will revisit periodically    Length of Voluntary ACP Conversation in minutes:  <16 minutes (Non-Billable)    Vanessa Rashid MSWBERONICAS

## 2022-08-23 NOTE — CONSULTS
Pharmacy Note  Vancomycin Consult    Shirley Donnelly is a 63 y.o. male started on Vancomycin for SSTI; consult received from Dr. Eddie Lindsay to manage therapy. Also receiving the following antibiotics: cefepime. Recent Labs     08/22/22  2100   BUN 16   CREATININE 0.9   WBC 11.1*       Estimated Creatinine Clearance: 90 mL/min (based on SCr of 0.9 mg/dL). Goal Trough Level: 15-20 mcg/mL  Goal AUC: 400-600 mg/L    Assessment/Plan:  Will initiate Vancomycin with a one time loading dose of 2000 mg x1, followed by 1750 mg IV every 24 hours. Per Pk-insight:  ZWE93,XT: 407 mg/L.hr  Probability of AUC24 > 400: 64 %  Ctrough,ss: 11.5 mg/L  Probability of Ctrough,ss > 20: 16 %  Probability of nephrotoxicity (Lodise WAGNER 2009): 7 %    Next trough: 8/24 @ 2000    Thank you for the consult. Will continue to follow.     Mary Lou Hughes, VinceD, [facility], 8/23/2022 4:06 AM

## 2022-08-23 NOTE — H&P
Hospital Medicine History & Physical      PCP: Jose L Johnson    Date of Service: Pt seen/examined on 8/22/22 and admitted on 8/22/22 to Inpatient    Chief Complaint   Patient presents with    Arm Pain     C/O right arm pain, no known injury, swelling and redness noted. History Of Present Illness: The patient is a 63 y.o. male with PMH below, presents with RUE pain, redness and swelling. Pt reports that he noticed pain to the area last night. When he awoke this am he noticed  pain, redness and swelling to much of his R arm. Pain has progressed throughout the day to being severe, constant. Exac by touch and movement of the arm. He does not have limited ROM of the elbow. He denies any injury, IVDU or recent blood draws. Past Medical History:    No past medical history on file. Chronic pain    Past Surgical History:        Procedure Laterality Date    HIP SURGERY         Medications Prior to Admission:    Prior to Admission medications    Medication Sig Start Date End Date Taking? Authorizing Provider   naproxen (NAPROSYN) 500 MG tablet Take 1 tablet by mouth 2 times daily as needed for Pain 5/11/22 5/21/22  Foreign Norman APRN - CNP   gabapentin (NEURONTIN) 600 MG tablet Take 600 mg by mouth 4 times daily. Historical Provider, MD   ibuprofen (ADVIL;MOTRIN) 800 MG tablet Take 1 tablet by mouth 3 times daily (with meals) 1/13/21   Keshia Echeverria MD       Allergies:  Patient has no known allergies. Social History:    TOBACCO:   reports that he has been smoking cigars. He has never used smokeless tobacco.  ETOH:   reports no history of alcohol use. Family History:  Reviewed in detail and negative for DM, Early CAD, Cancer (except as below). Positive as follows:    No family history on file.     REVIEW OF SYSTEMS:   Pertinent positives/negatives as follows: RUE pain, redness and swelling, and as discussed in HPI, otherwise a complete ROS performed and all other systems are negative. PHYSICAL EXAM PERFORMED:  /70   Pulse 92   Temp 97.9 °F (36.6 °C)   Resp 16   Ht 5' 10\" (1.778 m)   Wt 170 lb (77.1 kg)   SpO2 97%   BMI 24.39 kg/m²   GEN:  A&Ox3, NAD. HEENT:  NC/AT,EOMI, MMM, no erythema/exudates or visible masses. CVS:  Normal S1,S2. RRR. Without M/G/R.   LUNG:   CTA-B. No wheezes, rales or rhonchi. ABD:  Soft, ND/NT, BS+ x4. Without G/R.  EXT: 2+ pulses, no c/c/e. Brisk cap refill. PSY:  Thought process intact, affect appropriate. PENG:  CN III-XII grossly intact. Moves all 4 spontaneously. Sensory grossly intact. SKIN: Erythema, calor, edema to much of anterior of RUE. Extends from distal forearm to axilla, mainly anterior surface affected. Entire area very ttp. FROM of R elbow joint. See pics below courtesy of Hamzah Santiagoma (ED):                  Chart review shows recent radiographs:  XR ELBOW RIGHT (MIN 3 VIEWS)    Result Date: 8/22/2022  EXAMINATION: THREE XRAY VIEWS OF THE RIGHT ELBOW 8/22/2022 7:37 pm COMPARISON: None. HISTORY: ORDERING SYSTEM PROVIDED HISTORY: Elbow pain, Redness TECHNOLOGIST PROVIDED HISTORY: Reason for exam:->Elbow pain, Redness Reason for Exam: infection? redness swelling FINDINGS: Frontal, lateral, radial head, and oblique view radiographs of the right elbow were obtained. Bone mineralization is normal.  The osseous structures are intact without acute fracture or destructive abnormality. Joint relationships are maintained. No significant degenerative findings. No joint effusion. No appreciable soft tissue swelling or soft tissue gas. Unremarkable right elbow.      CBC:  Recent Labs     08/22/22  2100   WBC 11.1*   HGB 14.5   HCT 42.6         RENAL  Recent Labs     08/22/22  2100      K 3.8      CO2 26   BUN 16   CREATININE 0.9   GLUCOSE 100*     LFT'S:  Recent Labs     08/22/22  2100   AST 16   ALT 16   BILITOT 0.3   ALKPHOS 104     LACTIC ACID:  Recent Labs     08/22/22  2100   LACTA 0.9     PHYSICIAN CERTIFICATION  I certify that Amy Turner is expected to be hospitalized for 2 midnights based on the following assessment and plan:    ASSESSMENT/PLAN:  RUE cellulitis. Rapidly progressing over 24h involving most of RUE. IV vanco and cefepime. Does not meet SIRS. CRP and sed rate elevated. Lact nml. Leukocytosis, minimal at 11.1. Chronic pain, cont home gabapentin. OARRS verified. DVT Prophylaxis: Lx  Diet: gen  Code Status: Full Code   PT/OT Eval Status: Will order if needed and as patient condition allows  Dispo - Admit to inpatient     Kaylee Bautista MD    Thank you Maribel Kinney for the opportunity to be involved in this patient's care. If you have any questions or concerns please feel free to contact me via the [employer]swering Service at [phone]. This chart was generated using the [address] [place] [address] [address]  dictation system. I created this record but it may contain dictation errors given the limitations of this technology.

## 2022-08-23 NOTE — CARE COORDINATION
Case Management Assessment  Initial Evaluation      Patient Name: Alberto Rivera  YOB: 1960  Diagnosis: Cellulitis of right upper extremity [L03.113]  Date / Time: 8/22/2022  8:02 PM    Admission status/Date:08/22/2022 Inpatient   Chart Reviewed: Yes      Patient Interviewed: Yes   Family Interviewed:  No      Hospitalization in the last 30 days:  No    Health Care Decision Maker :  pt has no emergency contacts to list; see ACP note from writer     Met with:  pt at bedside    Current PCP: Leonela Pineda; he stated that it is with [facility]. He stated that he missed an appointment due to having 2 phones and the message was on his other phone    [address] required for SNF : Y          3 night stay required -  N    ADLS  Support Systems/Care Needs: Parent, Family Members  Transportation: self    Meal Preparation: self    Housing  Living Arrangements:  pt lives at home alone  Steps: 0  Intent for return to present living arrangements: Yes  Identified Issues: [id number] [facility] with [address] : No Agency:(Services)  Type of Home Care Services: None  Passport/Waiver : No  :                      Phone Number:    Passport/Waiver Services: n/a          Durable Medical Equiptment   DME Provider: n/a  Equipment: n/a    Home O2 Use :  No      Community Service Affiliation  Dialysis:  No    Agency:  Location:  Dialysis Schedule:  Phone:   Fax: Other Community Services: n/a    DISCHARGE PLAN: Explained Case Management role/services. Chart review completed. Met with pt at bedside. Pt was ambulating in his room throughout conversation. Pt stated he is independent and will return when discharged. He does not anticipate needing skilled [facility] when discharged. He denied needs for CM at this time. CM will follow. Please notify CM if needs or concerns arise.      Hanna Russell MSW, CHANO

## 2022-08-23 NOTE — FLOWSHEET NOTE
08/23/22 0915   Vital Signs   Temp 97.3 °F (36.3 °C)   Temp Source Oral   Heart Rate 70   Heart Rate Source Monitor   Resp 16   BP 97/60   BP Location Left upper arm   BP Method Automatic   MAP (Calculated) 72.33   Patient Position Semi fowlers   Level of Consciousness Alert (0)   MEWS Score 2   Pain Assessment   Pain Assessment 0-10   Pain Level 8   Opioid-Induced Sedation   POSS Score 1   Oxygen Therapy   SpO2 96 %   O2 Device None (Room air)   Shift assessment complete. See flow sheet. Scheduled medications given, See MAR. Head to toe complete. Vital signs logged and active bowel sounds in all 4 quadrants. Pt awake in bed. Medications taken without difficulty. Pt educated to keep R arm elevated to help with swelling. Pt provided with ice pack. No further needs noted at this time. Call light and bedside table within reach. Bed in lowest position, wheels locked and side rails up x2.      Zee Baker RN

## 2022-08-23 NOTE — ED PROVIDER NOTES
I independently performed a history and physical on [address]. I personally saw the patient and performed a substantive portion of the visit including all aspects of the medical decision making. All diagnostic, treatment, and disposition decisions were made by myself in conjunction with the advanced practice provider. I have participated in the medical decision making and directed the treatment plan and disposition of the patient. For further details of [facility] emergency department encounter, please see the advanced practice provider's documentation. CHIEF COMPLAINT  Chief Complaint   Patient presents with    Arm Pain     C/O right arm pain, no known injury, swelling and redness noted. Briefly, Cathy Gerber is a 63 y.o. male  who presents to the ED complaining of right upper extremity arm pain. FOCUSED PHYSICAL EXAMINATION  /70   Pulse 92   Temp 97.9 °F (36.6 °C)   Resp 16   Ht 5' 10\" (1.778 m)   Wt 170 lb (77.1 kg)   SpO2 97%   BMI 24.39 kg/m²      Focused physical examination:    General appearance:  Cooperative. No acute distress. Skin:  Warm. Dry. Large area of erythema, warmth and induration in the right medial upper extremity from the mid forearm up to the mid humerus region with induration of the skin in antecubital fossa but no fluctuance or mass. Ears, nose, mouth and throat:  Oral mucosa moist,  Perfusion:  intact with 2+ right radial and ulnar pulse  Respiratory: Respirations nonlabored. Neurological:  Alert. Moves all extremities spontaneously  Musculoskeletal:   Normal ROM, no deformities. Compartments of the right forearm are soft. No pain with passive range of motion of the right wrist or right elbow. Psychiatric:  Normal mood          MDM: Patient presents with very obvious right upper extremity cellulitis. Significantly uncomfortable and tender to touch but no drainable fluid collection.   While it is overlying the right medial elbow he has no pain with passive range of motion I have low suspicion for infected joint. Right arm was elevated at bedside with stockinette. Started on antibiotics. Plan to admit due to extent of cellulitis. Patient denies history of drug abuse or recent IV stick which would otherwise be the most likely etiology to this    During the patient's ED course, the patient was given:  Medications   cefepime (MAXIPIME) 2000 mg IVPB minibag (has no administration in time range)   vancomycin (VANCOCIN) 2,000 mg in dextrose 5 % 500 mL IVPB (has no administration in time range)          This chart was created using Dragon dictation software. Efforts were made by me to ensure accuracy, however some errors may be present due to limitations of this technology.             Radha Antoine MD  08/22/22 0527

## 2022-08-23 NOTE — PLAN OF CARE
Problem: Discharge Planning  Goal: Discharge to home or other facility with appropriate resources  8/23/2022 1026 by Ledy Palmer RN  Outcome: Progressing  8/23/2022 1025 by Ledy Palmer RN  Outcome: Progressing  Flowsheets (Taken 8/23/2022 0919)  Discharge to home or other facility with appropriate resources: Identify barriers to discharge with patient and caregiver  8/23/2022 0323 by Lindsay Conner RN  Outcome: Progressing  Flowsheets (Taken 8/23/2022 0015)  Discharge to home or other facility with appropriate resources:   Identify barriers to discharge with patient and caregiver   Arrange for needed discharge resources and transportation as appropriate   Identify discharge learning needs (meds, wound care, etc)   Arrange for interpreters to assist at discharge as needed   Refer to discharge planning if patient needs post-hospital services based on physician order or complex needs related to functional status, cognitive ability or social support system     Problem: Pain  Goal: Verbalizes/displays adequate comfort level or baseline comfort level  8/23/2022 1026 by Ledy Palmer RN  Outcome: Progressing  8/23/2022 1025 by Ledy Palmer RN  Outcome: Progressing  8/23/2022 0323 by Lindsay Conner RN  Outcome: Progressing

## 2022-08-23 NOTE — PROGRESS NOTES
Hospitalist Progress Note      PCP: Dahlia Green    Date of Admission: 8/22/2022    Chief Complaint: RUE edema and burning pain. Subjective:    ongoing pain in right arm and forearm. Medications:  Reviewed    Infusion Medications    sodium chloride      sodium chloride 75 mL/hr at 08/23/22 0111     Scheduled Medications    cefepime  2,000 mg IntraVENous Q12H    gabapentin  600 mg Oral 4x Daily    sodium chloride flush  5-40 mL IntraVENous 2 times per day    enoxaparin  40 mg SubCUTAneous Daily    vancomycin  1,750 mg IntraVENous Q24H     PRN Meds: sodium chloride flush, sodium chloride, ondansetron **OR** ondansetron, polyethylene glycol, acetaminophen **OR** acetaminophen, potassium chloride **OR** potassium alternative oral replacement **OR** potassium chloride, magnesium sulfate, oxyCODONE-acetaminophen **OR** oxyCODONE-acetaminophen      Intake/Output Summary (Last 24 hours) at 8/23/2022 1303  Last data filed at 8/23/2022 8073  Gross per 24 hour   Intake 50 ml   Output 800 ml   Net -750 ml       Physical Exam Performed:    BP 97/60   Pulse 70   Temp 97.3 °F (36.3 °C) (Oral)   Resp 18   Ht 5' 10\" (1.778 m)   Wt 173 lb 11.2 oz (78.8 kg)   SpO2 96%   BMI 24.92 kg/m²     General appearance: No apparent distress, appears stated age and cooperative. HEENT: Pupils equal, round, and reactive to light. Conjunctivae/corneas clear. Neck: Supple, with full range of motion. No jugular venous distention. Trachea midline. Respiratory:  Normal respiratory effort. Clear to auscultation, bilaterally without Rales/Wheezes/Rhonchi. Cardiovascular: Regular rate and rhythm with normal S1/S2 without murmurs, rubs or gallops. Abdomen: Soft, non-tender, non-distended with normal bowel sounds. Musculoskeletal:     R proximal forearm and distal arm well demarcated area of erythema and erysipelas. Antecubital fossa induration albeit without fluctuance.  No clearly visible penetrating skin defect or puncture wound. No needle marks. Extremity neurovascular intact and wrist and hand and shoulder and elbow appear spared at this time. Neurologic:  Neurovascularly intact without any focal sensory/motor deficits. Cranial nerves: II-XII intact, grossly non-focal.  Psychiatric: Alert and oriented, thought content appropriate, normal insight  Capillary Refill: Brisk,3 seconds, normal   Peripheral Pulses: +2 palpable, equal bilaterally       Labs:   Recent Labs     08/22/22 2100 08/23/22  0541   WBC 11.1* 9.5   HGB 14.5 14.1   HCT 42.6 43.1    164     Recent Labs     08/22/22  2100 08/23/22  0541    136   K 3.8 3.7    104   CO2 26 22   BUN 16 14   CREATININE 0.9 1.0   CALCIUM 9.1 8.6     Recent Labs     08/22/22 2100   AST 16   ALT 16   BILITOT 0.3   ALKPHOS 104     No results for input(s): INR in the last 72 hours. No results for input(s): Deepa Farmer in the last 72 hours. Urinalysis:      Lab Results   Component Value Date/Time    NITRU Neg 12/20/2012 07:15 PM    BLOODU Neg 12/20/2012 07:15 PM    SPECGRAV 1.025 12/20/2012 07:15 PM    GLUCOSEU Neg 12/20/2012 07:15 PM       Radiology:  XR ELBOW RIGHT (MIN 3 VIEWS)   Final Result   Unremarkable right elbow. Assessment/Plan:    Active Hospital Problems    Diagnosis     Cellulitis of right upper extremity [U85.469]      Priority: Medium         Cellulitis with Erysipelas R forearm and arm crossing the elbow joint without joint involvement. Cont cefepime and vanc. Ice and extremity elevation. Ok to d/c IVF. Monitor antecubital fossa for abscess formation              DVT Prophylaxis: lovenox  Diet: ADULT DIET;  Regular  Code Status: Full Code      Dispo - cc    Jaycee Avilez MD

## 2022-08-23 NOTE — ED PROVIDER NOTES
[address] ED  EMERGENCY DEPARTMENT ENCOUNTER        Pt Name: Amy Turner  MRN: [id number]  [place] 1960  Date of evaluation: 8/22/2022  Provider: DEDE Ramirez CNP  PCP: Maribel Kinney  Note Started: 9:21 PM EDT       I have seen and evaluated this patient with my supervising physician Isidro Rodriguez 113       Chief Complaint   Patient presents with    Arm Pain     C/O right arm pain, no known injury, swelling and redness noted. HISTORY OF PRESENT ILLNESS   (Location/Symptom, Timing/Onset, Context/Setting, Quality, Duration, Modifying Factors, Severity)  Note limiting factors. Chief Complaint: Right arm pain with swelling and redness    Amy Turner is a 63 y.o. male who presents to the emerge department symptoms of right arm pain involving the inner aspect of the right arm pointing to the area side of the elbow, forearm, and bicep. Patient states he has redness up to his armpit. He reports tenderness with palpation to the entire area of erythema. States that symptoms began little bit of irritation yesterday with some mild pain and then woke up this morning with the redness. Denies any known injury. No recent trauma or injection site. He denies any IV drug use. He states that he denies any recent injury to that site. No pain to his wrist or shoulder patient does report painful with range of motion of the elbow but states he is able to perform range of motion. Nursing Notes were all reviewed and agreed with or any disagreements were addressed in the HPI. REVIEW OF SYSTEMS    (2-9 systems for level 4, 10 or more for level 5)     Review of Systems   Constitutional:  Negative for chills, diaphoresis and fever. HENT:  Negative for congestion, ear pain, rhinorrhea and sore throat. Eyes:  Negative for pain and visual disturbance. Respiratory:  Negative for cough and shortness of breath.     Cardiovascular:  Negative for chest pain and leg swelling. Gastrointestinal:  Negative for abdominal pain, blood in stool, diarrhea, nausea and vomiting. Genitourinary:  Negative for difficulty urinating, dysuria, flank pain and frequency. Musculoskeletal:  Negative for back pain and neck pain. Right arm pain with redness and swelling   Skin:  Negative for rash and wound. Neurological:  Negative for dizziness and light-headedness. PAST MEDICAL HISTORY   No past medical history on file. SURGICAL HISTORY     Past Surgical History:   Procedure Laterality Date    HIP SURGERY         CURRENTMEDICATIONS       Previous Medications    GABAPENTIN (NEURONTIN) 600 MG TABLET    Take 600 mg by mouth 4 times daily. IBUPROFEN (ADVIL;MOTRIN) 800 MG TABLET    Take 1 tablet by mouth 3 times daily (with meals)    NAPROXEN (NAPROSYN) 500 MG TABLET    Take 1 tablet by mouth 2 times daily as needed for Pain       ALLERGIES     Patient has no known allergies. FAMILYHISTORY     No family history on file. SOCIAL HISTORY       Social History     Socioeconomic History    Marital status:    Tobacco Use    Smoking status: Light Smoker     Types: Cigars    Smokeless tobacco: Never   Substance and Sexual Activity    Alcohol use: No    Drug use: No    Sexual activity: Not Currently       SCREENINGS    [place] Coma Scale  Eye Opening: Spontaneous  Best Verbal Response: Oriented  Best Motor Response: Obeys commands  [place] Coma Scale Score: 15        PHYSICAL EXAM    (up to 7 for level 4, 8 or more for level 5)     ED Triage Vitals [08/22/22 2008]   BP Temp Temp src Heart Rate Resp SpO2 Height Weight   122/70 97.9 °F (36.6 °C) -- 92 16 97 % 5' 10\" (1.778 m) 170 lb (77.1 kg)       Physical Exam  Vitals and nursing note reviewed. Constitutional:       Appearance: Normal appearance. He is not toxic-appearing or diaphoretic. HENT:      Head: Normocephalic and atraumatic.       Nose: Nose normal.   Eyes:      General:         Right eye: No discharge. Left eye: No discharge. Cardiovascular:      Rate and Rhythm: Normal rate and regular rhythm. Pulses: Normal pulses. Heart sounds: No murmur heard. Pulmonary:      Effort: Pulmonary effort is normal. No respiratory distress. Breath sounds: No wheezing or rhonchi. Musculoskeletal:         General: Normal range of motion. Right upper arm: Swelling and tenderness present. Right elbow: Swelling present. Tenderness present. Right forearm: Swelling and tenderness present. Cervical back: Normal range of motion and neck supple. Comments: Swelling and tenderness with erythema noted across the forearm, elbow, and bicep. Does have range of motion but reports painful range of motion of the elbow. No shoulder pain or wrist pain. No hand swelling. 2+ radial pulse noted. Skin:     General: Skin is warm and dry. Neurological:      General: No focal deficit present. Mental Status: He is alert and oriented to person, place, and time. Psychiatric:         Mood and Affect: Mood normal.         Behavior: Behavior normal.                     DIAGNOSTIC RESULTS   LABS:    Labs Reviewed   CBC WITH AUTO DIFFERENTIAL - Abnormal; Notable for the following components:       Result Value    WBC 11.1 (*)     Neutrophils Absolute 9.0 (*)     All other components within normal limits   COMPREHENSIVE METABOLIC PANEL - Abnormal; Notable for the following components:    Glucose 100 (*)     All other components within normal limits   SEDIMENTATION RATE - Abnormal; Notable for the following components:    Sed Rate 34 (*)     All other components within normal limits   C-REACTIVE PROTEIN - Abnormal; Notable for the following components:    .2 (*)     All other components within normal limits   CULTURE, BLOOD 1   CULTURE, BLOOD 2   LACTIC ACID       When ordered, only abnormal lab results are displayed.  All other labs were within normal range or not returned as of this dictation. EKG: When ordered, EKG's are interpreted by the Emergency Department Physician in the absence of a cardiologist.  Please see their note for interpretation of EKG. RADIOLOGY:   Non-plain film images such as CT, Ultrasound and MRI are read by the radiologist. Plain radiographic images are visualized andpreliminarily interpreted by the  ED Provider with the below findings:        Interpretation perthe Radiologist below, if available at the time of this note:    XR ELBOW RIGHT (MIN 3 VIEWS)   Final Result   Unremarkable right elbow. XR ELBOW RIGHT (MIN 3 VIEWS)    Result Date: 8/22/2022  EXAMINATION: THREE XRAY VIEWS OF THE RIGHT ELBOW 8/22/2022 7:37 pm COMPARISON: None. HISTORY: ORDERING SYSTEM PROVIDED HISTORY: Elbow pain, Redness TECHNOLOGIST PROVIDED HISTORY: Reason for exam:->Elbow pain, Redness Reason for Exam: infection? redness swelling FINDINGS: Frontal, lateral, radial head, and oblique view radiographs of the right elbow were obtained. Bone mineralization is normal.  The osseous structures are intact without acute fracture or destructive abnormality. Joint relationships are maintained. No significant degenerative findings. No joint effusion. No appreciable soft tissue swelling or soft tissue gas. Unremarkable right elbow.          PROCEDURES   Unless otherwise noted below, none     Procedures    CRITICAL CARE TIME   N/A    CONSULTS:  IP CONSULT TO HOSPITALIST      EMERGENCY DEPARTMENT COURSE and DIFFERENTIAL DIAGNOSIS/MDM:   Vitals:    Vitals:    08/22/22 2008   BP: 122/70   Pulse: 92   Resp: 16   Temp: 97.9 °F (36.6 °C)   SpO2: 97%   Weight: 170 lb (77.1 kg)   Height: 5' 10\" (1.778 m)       Patient was given thefollowing medications:  Medications   vancomycin (VANCOCIN) 2,000 mg in dextrose 5 % 500 mL IVPB (2,000 mg IntraVENous New Bag 8/22/22 2217)   cefepime (MAXIPIME) 2000 mg IVPB minibag (2,000 mg IntraVENous New Bag 8/22/22 2126)   morphine sulfate (PF) injection 4 mg (4 mg IntraVENous Given 8/22/22 3317)         Is this patient to be included in the SEP-1 Core Measure due to severe sepsis or septic shock? No   Exclusion criteria - the patient is NOT to be included for SEP-1 Core Measure due to:  2+ SIRS criteria are not met    Patient is noted to have cellulitis of the right upper extremity. Cellulitis extends from the mid forearm up to the axilla. Cellulitis is not circumferential.  No drainage. No obvious source of infection/regular skin. No palpable abscess. At this time I believe safe for admission. We will go and provide vancomycin and cefepime for coverage of cellulitis. The patient is agreeable for admission. I spoke with the hospitalist in detail about the patient and they are currently evaluating patient for admission. I am the Primary Clinician of Record. FINAL IMPRESSION      1. Cellulitis of right upper extremity          DISPOSITION/PLAN   DISPOSITION Decision To Admit 08/22/2022 10:42:20 PM      PATIENT REFERREDTO:  No follow-up provider specified. DISCHARGE MEDICATIONS:  New Prescriptions    No medications on file       DISCONTINUED MEDICATIONS:  Discontinued Medications    METHYLPREDNISOLONE (MEDROL, SADI,) 4 MG TABLET    Take by mouth.               (Please note that portions ofthis note were completed with a voice recognition program.  Efforts were made to edit the dictations but occasionally words are mis-transcribed.)    DEDE Jacinto CNP (electronically signed)             DEDE Jacinto CNP  08/22/22 5701

## 2022-08-23 NOTE — FLOWSHEET NOTE
08/23/22 0000   Vital Signs   Temp 98.1 °F (36.7 °C)   Temp Source Oral   Heart Rate 75   Heart Rate Source Monitor   Resp 16   /75   BP Location Left upper arm   BP Method Automatic   MAP (Calculated) 92.67   Patient Position Semi fowlers   Level of Consciousness Alert (0)   MEWS Score 1   Opioid-Induced Sedation   POSS Score 1   RASS   [place] Agitation Sedation Scale (RASS) 0   Oxygen Therapy   SpO2 98 %   Pulse Oximetry Type Intermittent   Pulse Oximeter Device Mode Intermittent   Pulse Oximeter Device Location Right;Finger   O2 Device None (Room air)   Height and Weight   Height 5' 10\" (1.778 m)   Weight 173 lb 11.2 oz (78.8 kg)   Weight Method Actual;Bed scale   BSA (Calculated - sq m) 1.97 sq meters   BMI (Calculated) 25     Patient arrived to floor from ED. Patient A+Ox4, vitals and assessments done at time. Bed in lowest position, call light within reach.

## 2022-08-23 NOTE — PROGRESS NOTES
4 Eyes Skin Assessment     The patient is being assess for   Admission    I agree that 2 RN's have performed a thorough Head to Toe Skin Assessment on the patient. ALL assessment sites listed below have been assessed. Areas assessed for pressure by both nurses:   [x]   Head, Face, and Ears   [x]   Shoulders, Back, and Chest, Abdomen  [x]   Arms, Elbows, and Hands   [x]   Coccyx, Sacrum, and Ischium  [x]   Legs, Feet, and Heels    Patient has redness and swelling to RUE, no other non- healing wounds. Skin Assessed Under all Medical Devices by both nurses:  N/A               All Mepilex Borders were peeled back and area peeked at by both nurses:  No: N/A  Please list where Mepilex Borders are located:  N/A             **SHARE this note so that the co-signing nurse is able to place an eSignature**    Co-signer eSignature: {Esignature:[id number]}    Does the Patient have Skin Breakdown related to pressure? No              Didier Prevention initiated:  No   Wound Care Orders initiated:  NA      [facility] nurse consulted for Pressure Injury (Stage 3,4, Unstageable, DTI, NWPT, Complex wounds)and New or Established Ostomies:  NA      Primary Nurse eSignature: Electronically signed by Brunilda Echeverria RN on 8/23/22 at 4:48 AM EDT    Patient is able to demonstrate the ability to move from a reclining position to an upright position within the recliner. Bedside Mobility Assessment Tool (BMAT):     Assessment Level 1- Sit and Shake    1. From a semi-reclined position, ask patient to sit up and rotate to a seated position at the side of the bed. Can use the bedrail. 2. Ask patient to reach out and grab your hand and shake making sure patient reaches across his/her midline. Pass- Patient is able to come to a seated position, maintain core strength. Maintains seated balance while reaching across midline. Move on to Assessment Level 2. Assessment Level 2- Stretch and Point   1.  With patient in seated position at the side of the bed, have patient place both feet on the floor (or stool) with knees no higher than hips. 2. Ask patient to stretch one leg and straighten the knee, then bend the ankle/flex and point the toes. If appropriate, repeat with the other leg. Pass- Patient is able to demonstrate appropriate quad strength on intended weight bearing limb(s). Move onto Assessment Level 3. Assessment Level 3- Stand   1. Ask patient to elevate off the bed or chair (seated to standing) using an assistive device (cane, bedrail). 2. Patient should be able to raise buttocks off be and hold for a count of five. May repeat once. Pass- Patient maintains standing stability for at least 5 seconds, proceed to assessment level 4. Assessment Level 4- Walk   1. Ask patient to march in place at bedside. 2. Then ask patient to advance step and return each foot. Some medical conditions may render a patient from stepping backwards, use your best clinical judgement. Pass- Patient demonstrates balance while shifting weight and ability to step, takes independent steps, does not use assistive device patient is MOBILITY LEVEL 4.       Mobility Level- 4

## 2022-08-24 ENCOUNTER — APPOINTMENT (OUTPATIENT)
Dept: CT IMAGING | Age: 62
DRG: 364 | End: 2022-08-24
Payer: COMMERCIAL

## 2022-08-24 LAB
ANION GAP SERPL CALCULATED.3IONS-SCNC: 10 MMOL/L (ref 3–16)
BUN BLDV-MCNC: 12 MG/DL (ref 7–20)
CALCIUM SERPL-MCNC: 8.6 MG/DL (ref 8.3–10.6)
CHLORIDE BLD-SCNC: 106 MMOL/L (ref 99–110)
CO2: 23 MMOL/L (ref 21–32)
CREAT SERPL-MCNC: 1 MG/DL (ref 0.8–1.3)
GFR AFRICAN AMERICAN: >60
GFR NON-AFRICAN AMERICAN: >60
GLUCOSE BLD-MCNC: 115 MG/DL (ref 70–99)
HCT VFR BLD CALC: 42.6 % (ref 40.5–52.5)
HEMOGLOBIN: 13.9 G/DL (ref 13.5–17.5)
MCH RBC QN AUTO: 31.6 PG (ref 26–34)
MCHC RBC AUTO-ENTMCNC: 32.6 G/DL (ref 31–36)
MCV RBC AUTO: 96.9 FL (ref 80–100)
PDW BLD-RTO: 15.3 % (ref 12.4–15.4)
PLATELET # BLD: 172 K/UL (ref 135–450)
PMV BLD AUTO: 7.9 FL (ref 5–10.5)
POTASSIUM SERPL-SCNC: 4.3 MMOL/L (ref 3.5–5.1)
RBC # BLD: 4.39 M/UL (ref 4.2–5.9)
SODIUM BLD-SCNC: 139 MMOL/L (ref 136–145)
VANCOMYCIN TROUGH: 9.6 UG/ML (ref 10–20)
WBC # BLD: 7.6 K/UL (ref 4–11)

## 2022-08-24 PROCEDURE — 6360000004 HC RX CONTRAST MEDICATION: Performed by: SPECIALIST/TECHNOLOGIST

## 2022-08-24 PROCEDURE — 36415 COLL VENOUS BLD VENIPUNCTURE: CPT

## 2022-08-24 PROCEDURE — 80202 ASSAY OF VANCOMYCIN: CPT

## 2022-08-24 PROCEDURE — 6370000000 HC RX 637 (ALT 250 FOR IP): Performed by: INTERNAL MEDICINE

## 2022-08-24 PROCEDURE — 2580000003 HC RX 258: Performed by: INTERNAL MEDICINE

## 2022-08-24 PROCEDURE — 6360000002 HC RX W HCPCS: Performed by: INTERNAL MEDICINE

## 2022-08-24 PROCEDURE — 73201 CT UPPER EXTREMITY W/DYE: CPT

## 2022-08-24 PROCEDURE — 1200000000 HC SEMI PRIVATE

## 2022-08-24 PROCEDURE — 99226 PR SBSQ OBSERVATION CARE/DAY 35 MINUTES: CPT | Performed by: INTERNAL MEDICINE

## 2022-08-24 PROCEDURE — 85027 COMPLETE CBC AUTOMATED: CPT

## 2022-08-24 PROCEDURE — 80048 BASIC METABOLIC PNL TOTAL CA: CPT

## 2022-08-24 RX ORDER — NICOTINE 21 MG/24HR
1 PATCH, TRANSDERMAL 24 HOURS TRANSDERMAL DAILY
Status: DISCONTINUED | OUTPATIENT
Start: 2022-08-24 | End: 2022-08-27 | Stop reason: HOSPADM

## 2022-08-24 RX ORDER — DOCUSATE SODIUM 100 MG/1
100 CAPSULE, LIQUID FILLED ORAL 2 TIMES DAILY
Status: DISCONTINUED | OUTPATIENT
Start: 2022-08-24 | End: 2022-08-27 | Stop reason: HOSPADM

## 2022-08-24 RX ORDER — POLYETHYLENE GLYCOL 3350 17 G/17G
17 POWDER, FOR SOLUTION ORAL DAILY
Status: DISCONTINUED | OUTPATIENT
Start: 2022-08-24 | End: 2022-08-27 | Stop reason: HOSPADM

## 2022-08-24 RX ADMIN — VANCOMYCIN HYDROCHLORIDE 1750 MG: 10 INJECTION, POWDER, LYOPHILIZED, FOR SOLUTION INTRAVENOUS at 22:02

## 2022-08-24 RX ADMIN — OXYCODONE HYDROCHLORIDE AND ACETAMINOPHEN 2 TABLET: 5; 325 TABLET ORAL at 21:56

## 2022-08-24 RX ADMIN — GABAPENTIN 600 MG: 300 CAPSULE ORAL at 10:47

## 2022-08-24 RX ADMIN — OXYCODONE HYDROCHLORIDE AND ACETAMINOPHEN 2 TABLET: 5; 325 TABLET ORAL at 06:46

## 2022-08-24 RX ADMIN — GABAPENTIN 600 MG: 300 CAPSULE ORAL at 17:26

## 2022-08-24 RX ADMIN — GABAPENTIN 600 MG: 300 CAPSULE ORAL at 21:57

## 2022-08-24 RX ADMIN — DOCUSATE SODIUM 100 MG: 100 CAPSULE, LIQUID FILLED ORAL at 14:36

## 2022-08-24 RX ADMIN — GABAPENTIN 600 MG: 300 CAPSULE ORAL at 14:37

## 2022-08-24 RX ADMIN — CEFEPIME 2000 MG: 2 INJECTION, POWDER, FOR SOLUTION INTRAVENOUS at 17:27

## 2022-08-24 RX ADMIN — OXYCODONE HYDROCHLORIDE AND ACETAMINOPHEN 2 TABLET: 5; 325 TABLET ORAL at 00:46

## 2022-08-24 RX ADMIN — OXYCODONE HYDROCHLORIDE AND ACETAMINOPHEN 2 TABLET: 5; 325 TABLET ORAL at 14:44

## 2022-08-24 RX ADMIN — DOCUSATE SODIUM 100 MG: 100 CAPSULE, LIQUID FILLED ORAL at 21:57

## 2022-08-24 RX ADMIN — CEFEPIME 2000 MG: 2 INJECTION, POWDER, FOR SOLUTION INTRAVENOUS at 06:05

## 2022-08-24 RX ADMIN — ENOXAPARIN SODIUM 40 MG: 100 INJECTION SUBCUTANEOUS at 10:47

## 2022-08-24 RX ADMIN — IOPAMIDOL 75 ML: 755 INJECTION, SOLUTION INTRAVENOUS at 15:16

## 2022-08-24 ASSESSMENT — PAIN DESCRIPTION - ORIENTATION
ORIENTATION: RIGHT

## 2022-08-24 ASSESSMENT — PAIN DESCRIPTION - DESCRIPTORS
DESCRIPTORS: SHARP
DESCRIPTORS: ACHING;BURNING
DESCRIPTORS: SHARP

## 2022-08-24 ASSESSMENT — PAIN SCALES - GENERAL
PAINLEVEL_OUTOF10: 4
PAINLEVEL_OUTOF10: 8
PAINLEVEL_OUTOF10: 8
PAINLEVEL_OUTOF10: 4
PAINLEVEL_OUTOF10: 7
PAINLEVEL_OUTOF10: 8
PAINLEVEL_OUTOF10: 9

## 2022-08-24 ASSESSMENT — PAIN DESCRIPTION - LOCATION
LOCATION: ARM
LOCATION: ARM
LOCATION: ARM;SHOULDER
LOCATION: SHOULDER;ARM

## 2022-08-24 ASSESSMENT — PAIN - FUNCTIONAL ASSESSMENT: PAIN_FUNCTIONAL_ASSESSMENT: ACTIVITIES ARE NOT PREVENTED

## 2022-08-24 NOTE — FLOWSHEET NOTE
08/24/22 1030   Vital Signs   Temp 97.8 °F (36.6 °C)   Temp Source Oral   Heart Rate 73   Heart Rate Source Monitor   Resp 16   BP 99/61   BP Location Left lower arm   BP Method Automatic   MAP (Calculated) 73.67   Patient Position Semi fowlers   Level of Consciousness Alert (0)   MEWS Score 2   Pain Assessment   Pain Assessment 0-10   Pain Level 7   Opioid-Induced Sedation   POSS Score 1   Oxygen Therapy   SpO2 96 %   O2 Device None (Room air)   Shift assessment complete. See flow sheet. Scheduled medications given, See MAR. Head to toe complete. Vital signs logged and active bowel sounds in all 4 quadrants. Pt awake in bed. Medications taken without difficulty. No further needs noted at this time. Call light and bedside table within reach. Bed in lowest position, wheels locked and side rails up x2.      Nilam Mai RN

## 2022-08-24 NOTE — PROGRESS NOTES
Consult has been called to [address] [place] [address] on 8/24/22. Spoke with Destinee cassidy via perfect serve.  12:41 PM    Alba Hernandez  8/24/2022

## 2022-08-24 NOTE — PLAN OF CARE
Problem: Discharge Planning  Goal: Discharge to home or other facility with appropriate resources  8/24/2022 1136 by Berta Villalba RN  Outcome: Progressing  Flowsheets (Taken 8/24/2022 1045)  Discharge to home or other facility with appropriate resources: Identify barriers to discharge with patient and caregiver  8/24/2022 0355 by Christy Hanson RN  Outcome: Progressing     Problem: Pain  Goal: Verbalizes/displays adequate comfort level or baseline comfort level  8/24/2022 1136 by Berta Villalba RN  Outcome: Progressing  8/24/2022 0355 by Christy Hanson RN  Outcome: Progressing

## 2022-08-24 NOTE — CARE COORDINATION
INTERDISCIPLINARY PLAN OF CARE CONFERENCE    Date/Time: 8/24/2022 5:21 PM  Completed by: Mariano Holbrook RN, Case Management      Patient Name:  Kira Albarado  YOB: 1960  Admitting Diagnosis: Cellulitis of right upper extremity [L03.113]     Admit Date/Time:  8/22/2022  8:02 PM    Chart reviewed. Interdisciplinary team contacted or reviewed plan related to patient progress and discharge plans. Disciplines included Case Management, Nursing, and Dietitian. Current Status:Stable  PT/OT recommendation for discharge plan of care: N/A    Expected D/C Disposition:  Home  Confirmed plan with patient and/or family Yes   Met with:jeremy  Discharge Plan Comments: Reviewed chart and met with pt who cont plan for home. Denies needs. Will follow for poss HHC/ABx needs.      Home O2 in place on admit: No  Pt informed of need to bring portable home O2 tank on day of discharge for nursing to connect prior to leaving:  No  Verbalized agreement/Understanding:  No

## 2022-08-24 NOTE — PROGRESS NOTES
Vancomycin Day: 3/7  Current Regimen: 1750 mg IV every 24 hours    Patient's labs, cultures, vitals, and vancomycin regimen reviewed.    SCr stable  U/O not measured/well documented  [employer] updated    Plan:   Order level for 8/24 [facility] [place] [facility] D 8/24/202210:50 AM  .

## 2022-08-24 NOTE — FLOWSHEET NOTE
08/23/22 1912   Vital Signs   Temp 98.9 °F (37.2 °C)   Temp Source Oral   Heart Rate 78   Heart Rate Source Monitor   Resp 16   /64   BP Location Left upper arm   BP Method Automatic   MAP (Calculated) 77.33   Patient Position High fowlers   Level of Consciousness Alert (0)   MEWS Score 1   Oxygen Therapy   SpO2 93 %   O2 Device None (Room air)     Patient A+Ox4, vitals and assessments done at this time. Bed in lowest position, call light within reach.

## 2022-08-24 NOTE — PROGRESS NOTES
Pt found out in parking lot headed to his truck to smoke. Escorted back to room via PCA. Informed pt that smoking is not permitted inside or outside hospital. Offered to get an order for a nicotine patch. Perfect serve placed to MD for patch. Pt verbalized understanding about not being permitted to leave the floor.
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
## 2022-08-25 LAB
ANION GAP SERPL CALCULATED.3IONS-SCNC: 10 MMOL/L (ref 3–16)
BUN BLDV-MCNC: 10 MG/DL (ref 7–20)
CALCIUM SERPL-MCNC: 8.7 MG/DL (ref 8.3–10.6)
CHLORIDE BLD-SCNC: 104 MMOL/L (ref 99–110)
CO2: 26 MMOL/L (ref 21–32)
CREAT SERPL-MCNC: 0.9 MG/DL (ref 0.8–1.3)
GFR AFRICAN AMERICAN: >60
GFR NON-AFRICAN AMERICAN: >60
GLUCOSE BLD-MCNC: 113 MG/DL (ref 70–99)
HCT VFR BLD CALC: 38.1 % (ref 40.5–52.5)
HEMOGLOBIN: 12.8 G/DL (ref 13.5–17.5)
MCH RBC QN AUTO: 31.8 PG (ref 26–34)
MCHC RBC AUTO-ENTMCNC: 33.5 G/DL (ref 31–36)
MCV RBC AUTO: 94.7 FL (ref 80–100)
PDW BLD-RTO: 14.6 % (ref 12.4–15.4)
PLATELET # BLD: 216 K/UL (ref 135–450)
PMV BLD AUTO: 7.6 FL (ref 5–10.5)
POTASSIUM SERPL-SCNC: 4.3 MMOL/L (ref 3.5–5.1)
RBC # BLD: 4.03 M/UL (ref 4.2–5.9)
SODIUM BLD-SCNC: 140 MMOL/L (ref 136–145)
WBC # BLD: 7.8 K/UL (ref 4–11)

## 2022-08-25 PROCEDURE — 36415 COLL VENOUS BLD VENIPUNCTURE: CPT

## 2022-08-25 PROCEDURE — 85027 COMPLETE CBC AUTOMATED: CPT

## 2022-08-25 PROCEDURE — 2580000003 HC RX 258: Performed by: INTERNAL MEDICINE

## 2022-08-25 PROCEDURE — 99252 IP/OBS CONSLTJ NEW/EST SF 35: CPT | Performed by: PHYSICIAN ASSISTANT

## 2022-08-25 PROCEDURE — 6370000000 HC RX 637 (ALT 250 FOR IP): Performed by: INTERNAL MEDICINE

## 2022-08-25 PROCEDURE — 80048 BASIC METABOLIC PNL TOTAL CA: CPT

## 2022-08-25 PROCEDURE — 2500000003 HC RX 250 WO HCPCS: Performed by: INTERNAL MEDICINE

## 2022-08-25 PROCEDURE — 6360000002 HC RX W HCPCS: Performed by: INTERNAL MEDICINE

## 2022-08-25 PROCEDURE — 99226 PR SBSQ OBSERVATION CARE/DAY 35 MINUTES: CPT | Performed by: INTERNAL MEDICINE

## 2022-08-25 PROCEDURE — 1200000000 HC SEMI PRIVATE

## 2022-08-25 RX ORDER — METRONIDAZOLE 500 MG/100ML
500 INJECTION, SOLUTION INTRAVENOUS EVERY 8 HOURS
Status: DISCONTINUED | OUTPATIENT
Start: 2022-08-25 | End: 2022-08-27 | Stop reason: HOSPADM

## 2022-08-25 RX ADMIN — DOCUSATE SODIUM 100 MG: 100 CAPSULE, LIQUID FILLED ORAL at 21:48

## 2022-08-25 RX ADMIN — Medication 10 ML: at 10:25

## 2022-08-25 RX ADMIN — SODIUM CHLORIDE: 9 INJECTION, SOLUTION INTRAVENOUS at 16:44

## 2022-08-25 RX ADMIN — VANCOMYCIN HYDROCHLORIDE 1750 MG: 10 INJECTION, POWDER, LYOPHILIZED, FOR SOLUTION INTRAVENOUS at 21:49

## 2022-08-25 RX ADMIN — OXYCODONE HYDROCHLORIDE AND ACETAMINOPHEN 2 TABLET: 5; 325 TABLET ORAL at 16:39

## 2022-08-25 RX ADMIN — CEFEPIME 2000 MG: 2 INJECTION, POWDER, FOR SOLUTION INTRAVENOUS at 18:08

## 2022-08-25 RX ADMIN — GABAPENTIN 600 MG: 300 CAPSULE ORAL at 14:01

## 2022-08-25 RX ADMIN — METRONIDAZOLE 500 MG: 500 INJECTION, SOLUTION INTRAVENOUS at 16:45

## 2022-08-25 RX ADMIN — METRONIDAZOLE 500 MG: 500 INJECTION, SOLUTION INTRAVENOUS at 10:31

## 2022-08-25 RX ADMIN — GABAPENTIN 600 MG: 300 CAPSULE ORAL at 21:48

## 2022-08-25 RX ADMIN — GABAPENTIN 600 MG: 300 CAPSULE ORAL at 16:39

## 2022-08-25 RX ADMIN — CEFEPIME 2000 MG: 2 INJECTION, POWDER, FOR SOLUTION INTRAVENOUS at 05:01

## 2022-08-25 RX ADMIN — OXYCODONE HYDROCHLORIDE AND ACETAMINOPHEN 2 TABLET: 5; 325 TABLET ORAL at 04:57

## 2022-08-25 RX ADMIN — DOCUSATE SODIUM 100 MG: 100 CAPSULE, LIQUID FILLED ORAL at 10:24

## 2022-08-25 RX ADMIN — GABAPENTIN 600 MG: 300 CAPSULE ORAL at 10:24

## 2022-08-25 RX ADMIN — ENOXAPARIN SODIUM 40 MG: 100 INJECTION SUBCUTANEOUS at 10:24

## 2022-08-25 RX ADMIN — OXYCODONE HYDROCHLORIDE AND ACETAMINOPHEN 2 TABLET: 5; 325 TABLET ORAL at 23:17

## 2022-08-25 RX ADMIN — POLYETHYLENE GLYCOL (3350) 17 G: 17 POWDER, FOR SOLUTION ORAL at 10:24

## 2022-08-25 RX ADMIN — OXYCODONE HYDROCHLORIDE AND ACETAMINOPHEN 2 TABLET: 5; 325 TABLET ORAL at 10:43

## 2022-08-25 ASSESSMENT — PAIN DESCRIPTION - LOCATION
LOCATION: ARM

## 2022-08-25 ASSESSMENT — PAIN SCALES - GENERAL
PAINLEVEL_OUTOF10: 8
PAINLEVEL_OUTOF10: 8
PAINLEVEL_OUTOF10: 10
PAINLEVEL_OUTOF10: 9
PAINLEVEL_OUTOF10: 8
PAINLEVEL_OUTOF10: 2
PAINLEVEL_OUTOF10: 8

## 2022-08-25 ASSESSMENT — PAIN DESCRIPTION - ORIENTATION
ORIENTATION: RIGHT

## 2022-08-25 ASSESSMENT — PAIN DESCRIPTION - DESCRIPTORS
DESCRIPTORS: BURNING
DESCRIPTORS: ACHING;DISCOMFORT
DESCRIPTORS: BURNING
DESCRIPTORS: ACHING;DISCOMFORT
DESCRIPTORS: ACHING;BURNING

## 2022-08-25 ASSESSMENT — PAIN - FUNCTIONAL ASSESSMENT
PAIN_FUNCTIONAL_ASSESSMENT: ACTIVITIES ARE NOT PREVENTED
PAIN_FUNCTIONAL_ASSESSMENT: PREVENTS OR INTERFERES SOME ACTIVE ACTIVITIES AND ADLS
PAIN_FUNCTIONAL_ASSESSMENT: ACTIVITIES ARE NOT PREVENTED
PAIN_FUNCTIONAL_ASSESSMENT: ACTIVITIES ARE NOT PREVENTED

## 2022-08-25 ASSESSMENT — PAIN DESCRIPTION - PAIN TYPE: TYPE: ACUTE PAIN

## 2022-08-25 NOTE — CONSULTS
Department of Orthopedic Surgery  Physician Assistant   Consult Note        Reason for Consult:  right elbow cellulitis  Requesting Physician: Janneth Card MD  Date of Service: 8/25/2022 11:38 AM    CHIEF COMPLAINT:  As Above    History Obtained From:  patient    HISTORY OF PRESENT ILLNESS:                The patient is a 63 y.o. male who presents with above chief complaint. Pt reports several day history of redness and swelling in the right elbow. Denies wounds or drainage. Denies hx of this. No shoulder or wrist pain. States after ABX hs noticed a little improvement in the redness but not pain or swelling. From admission history: The patient is a 63 y.o. male with PMH below, presents with RUE pain, redness and swelling. Pt reports that he noticed pain to the area last night. When he awoke this am he noticed  pain, redness and swelling to much of his R arm. Pain has progressed throughout the day to being severe, constant. Exac by touch and movement of the arm. He does not have limited ROM of the elbow. He denies any injury, IVDU or recent blood draws. Past Medical History:    History reviewed. No pertinent past medical history. Past Surgical History:        Procedure Laterality Date    HIP SURGERY           Medications Prior to Admission:   Prior to Admission medications    Medication Sig Start Date End Date Taking? Authorizing Provider   naproxen (NAPROSYN) 500 MG tablet Take 1 tablet by mouth 2 times daily as needed for Pain 5/11/22 5/21/22  DEDE Miranda - CNP   gabapentin (NEURONTIN) 600 MG tablet Take 600 mg by mouth 4 times daily. Historical Provider, MD   ibuprofen (ADVIL;MOTRIN) 800 MG tablet Take 1 tablet by mouth 3 times daily (with meals) 1/13/21   Sabas Bañuelos MD       Allergies:  Patient has no known allergies. Social History:    Tobacco:  reports that he has been smoking cigars.  He has never used smokeless tobacco.   Alcohol:  reports no history of agree with the above information as documented above. In summary, the patient is a 74-year-old right-hand-dominant male with right antecubital fossa cellulitis with induration. This has been refractory to IV antibiotic treatment. He notes minor improvement in pain, but it is still significantly swollen and reddened in this area. I discussed with him that he is not following a standard improvement course for a straightforward cellulitis case. I recommended repeat inflammatory labs including C-reactive protein and sedimentation rate. I offered irrigation and debridement of the antecubital fossa/wound exploration in this area. I discussed the risks, benefits, and alternatives. If he is not significantly improved overnight with broadened antibiotic spectrum, we will proceed with I&D. Tentative timing is at noon tomorrow. N.p.o. at midnight in preparation. Hold anticoagulants. All questions answered. Patient is in agreement with this plan.     Lindy Zuniga MD
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
## 2022-08-25 NOTE — PROGRESS NOTES
C/o right arm pain. Pt asking for pain medication. Explained that medication not available until 25 [id number]. Pt wanting nurse to ask md to change medication. Message sent to MD. Waiting for response. Call light in reach.

## 2022-08-25 NOTE — PROGRESS NOTES
cooperative. HEENT: Pupils equal, round, and reactive to light. Conjunctivae/corneas clear. Neck: Supple, with full range of motion. No jugular venous distention. Trachea midline. Respiratory:  Normal respiratory effort. Clear to auscultation, bilaterally without Rales/Wheezes/Rhonchi. Cardiovascular: Regular rate and rhythm with normal S1/S2 without murmurs, rubs or gallops. Abdomen: Soft, non-tender, non-distended with normal bowel sounds. Musculoskeletal:     R proximal forearm and distal arm well demarcated area of erythema and erysipelas. Antecubital fossa induration albeit without fluctuance. No clearly visible penetrating skin defect or puncture wound. No needle marks. Extremity neurovascular intact and wrist and hand and shoulder and elbow appear spared at this time. Neurologic:  Neurovascularly intact without any focal sensory/motor deficits. Cranial nerves: II-XII intact, grossly non-focal.  Psychiatric: Alert and oriented, thought content appropriate, normal insight  Capillary Refill: Brisk,3 seconds, normal   Peripheral Pulses: +2 palpable, equal bilaterally       Labs:   Recent Labs     08/23/22  0541 08/24/22  0528 08/25/22  0545   WBC 9.5 7.6 7.8   HGB 14.1 13.9 12.8*   HCT 43.1 42.6 38.1*    172 216       Recent Labs     08/23/22  0541 08/24/22  0527 08/25/22  0545    139 140   K 3.7 4.3 4.3    106 104   CO2 22 23 26   BUN 14 12 10   CREATININE 1.0 1.0 0.9   CALCIUM 8.6 8.6 8.7       Recent Labs     08/22/22  2100   AST 16   ALT 16   BILITOT 0.3   ALKPHOS 104       No results for input(s): INR in the last 72 hours. No results for input(s): Cheyanne Arboleda in the last 72 hours. Urinalysis:      Lab Results   Component Value Date/Time    NITRU Neg 12/20/2012 07:15 PM    BLOODU Neg 12/20/2012 07:15 PM    SPECGRAV 1.025 12/20/2012 07:15 PM    GLUCOSEU Neg 12/20/2012 07:15 PM       Radiology:  CT ELBOW RIGHT W CONTRAST   Final Result   1.  Subcutaneous fat stranding about the elbow compatible with cellulitis. No   abscess or soft tissue gas. 2. No acute osseous abnormality. XR ELBOW RIGHT (MIN 3 VIEWS)   Final Result   Unremarkable right elbow. Assessment/Plan:    Active Hospital Problems    Diagnosis     Cellulitis of right upper extremity [B09.189]      Priority: Medium         Cellulitis with Erysipelas R forearm and arm crossing the elbow joint without joint involvement. Growing area of induration with likely early abscess R antecubital fossa. Cont cefepime and vanc. Ice and extremity elevation. Ok to d/c IVF. Monitor antecubital fossa for abscess formation   - ortho involved   - CT reviewed - no abscess at this time. - cont IV abx and add flagyl for anaerobic coverage.   - may need I&D pending response to above. DVT Prophylaxis: lovenox  Diet: ADULT DIET;  Regular  Code Status: Full Code      Dispo - cc    Ulysses Morrell MD

## 2022-08-25 NOTE — PROGRESS NOTES
Pharmacy Vancomycin Consult     Vancomycin Day: 3/7  Current Dosinmg q24  Current indication: SSTI      Recent Labs     22  0541 22  0527 22  0528   BUN 14 12  --    CREATININE 1.0 1.0  --    WBC 9.5  --  7.6       Intake/Output Summary (Last 24 hours) at 2022  Last data filed at 2022 1815  Gross per 24 hour   Intake 610 ml   Output 1100 ml   Net -490 ml     Culture Date      Source                       Results      Ht Readings from Last 1 Encounters:   22 5' 10\" (1.778 m)        Wt Readings from Last 1 Encounters:   22 177 lb 3.2 oz (80.4 kg)       Body mass index is 25.43 kg/m². Estimated Creatinine Clearance: 81 mL/min (based on SCr of 1 mg/dL). Trough: 9.6    Assessment/Plan:  Continue current regimen of 1750mg q24h  Predicted AUC of 438 and trough of 10.6  Pharmacy will continue to monitor renal function and reorder a level if necessary.

## 2022-08-25 NOTE — PROGRESS NOTES
Vancomycin Day: 4  Current Regimen: 1750 mg IV every 24 hours    Patient's labs, cultures, vitals, and vancomycin regimen reviewed.    SCr stable  U/O not measured/well documented  [facility] updated    Plan:   No change today  Ashley Covarrubias Pharm D 8/25/202211:58 AM  .

## 2022-08-26 ENCOUNTER — ANESTHESIA (OUTPATIENT)
Dept: OPERATING ROOM | Age: 62
DRG: 364 | End: 2022-08-26
Payer: COMMERCIAL

## 2022-08-26 ENCOUNTER — ANESTHESIA EVENT (OUTPATIENT)
Dept: OPERATING ROOM | Age: 62
DRG: 364 | End: 2022-08-26
Payer: COMMERCIAL

## 2022-08-26 PROBLEM — L02.413 ABSCESS OF RIGHT ELBOW: Status: ACTIVE | Noted: 2022-08-26

## 2022-08-26 LAB
ANION GAP SERPL CALCULATED.3IONS-SCNC: 10 MMOL/L (ref 3–16)
BLOOD CULTURE, ROUTINE: NORMAL
BUN BLDV-MCNC: 9 MG/DL (ref 7–20)
C-REACTIVE PROTEIN: 140 MG/L (ref 0–5.1)
CALCIUM SERPL-MCNC: 8.8 MG/DL (ref 8.3–10.6)
CHLORIDE BLD-SCNC: 104 MMOL/L (ref 99–110)
CO2: 25 MMOL/L (ref 21–32)
CREAT SERPL-MCNC: 1 MG/DL (ref 0.8–1.3)
CULTURE, BLOOD 2: NORMAL
GFR AFRICAN AMERICAN: >60
GFR NON-AFRICAN AMERICAN: >60
GLUCOSE BLD-MCNC: 109 MG/DL (ref 70–99)
GLUCOSE BLD-MCNC: 99 MG/DL (ref 70–99)
HCT VFR BLD CALC: 38.1 % (ref 40.5–52.5)
HEMOGLOBIN: 13 G/DL (ref 13.5–17.5)
MCH RBC QN AUTO: 31.9 PG (ref 26–34)
MCHC RBC AUTO-ENTMCNC: 34.2 G/DL (ref 31–36)
MCV RBC AUTO: 93.5 FL (ref 80–100)
PDW BLD-RTO: 15.1 % (ref 12.4–15.4)
PERFORMED ON: NORMAL
PLATELET # BLD: 248 K/UL (ref 135–450)
PMV BLD AUTO: 7.5 FL (ref 5–10.5)
POTASSIUM SERPL-SCNC: 4.3 MMOL/L (ref 3.5–5.1)
RBC # BLD: 4.07 M/UL (ref 4.2–5.9)
SEDIMENTATION RATE, ERYTHROCYTE: 84 MM/HR (ref 0–20)
SODIUM BLD-SCNC: 139 MMOL/L (ref 136–145)
WBC # BLD: 8 K/UL (ref 4–11)

## 2022-08-26 PROCEDURE — 2500000003 HC RX 250 WO HCPCS: Performed by: ORTHOPAEDIC SURGERY

## 2022-08-26 PROCEDURE — 0J9G0ZZ DRAINAGE OF RIGHT LOWER ARM SUBCUTANEOUS TISSUE AND FASCIA, OPEN APPROACH: ICD-10-PCS | Performed by: ORTHOPAEDIC SURGERY

## 2022-08-26 PROCEDURE — 2709999900 HC NON-CHARGEABLE SUPPLY: Performed by: ORTHOPAEDIC SURGERY

## 2022-08-26 PROCEDURE — 6370000000 HC RX 637 (ALT 250 FOR IP): Performed by: INTERNAL MEDICINE

## 2022-08-26 PROCEDURE — 2580000003 HC RX 258: Performed by: ORTHOPAEDIC SURGERY

## 2022-08-26 PROCEDURE — 3700000001 HC ADD 15 MINUTES (ANESTHESIA): Performed by: ORTHOPAEDIC SURGERY

## 2022-08-26 PROCEDURE — 87075 CULTR BACTERIA EXCEPT BLOOD: CPT

## 2022-08-26 PROCEDURE — 6360000002 HC RX W HCPCS: Performed by: ANESTHESIOLOGY

## 2022-08-26 PROCEDURE — 2580000003 HC RX 258: Performed by: INTERNAL MEDICINE

## 2022-08-26 PROCEDURE — 87070 CULTURE OTHR SPECIMN AEROBIC: CPT

## 2022-08-26 PROCEDURE — 7100000001 HC PACU RECOVERY - ADDTL 15 MIN: Performed by: ORTHOPAEDIC SURGERY

## 2022-08-26 PROCEDURE — 87186 SC STD MICRODIL/AGAR DIL: CPT

## 2022-08-26 PROCEDURE — 6360000002 HC RX W HCPCS

## 2022-08-26 PROCEDURE — 6370000000 HC RX 637 (ALT 250 FOR IP): Performed by: ORTHOPAEDIC SURGERY

## 2022-08-26 PROCEDURE — 10060 I&D ABSCESS SIMPLE/SINGLE: CPT | Performed by: ORTHOPAEDIC SURGERY

## 2022-08-26 PROCEDURE — 85652 RBC SED RATE AUTOMATED: CPT

## 2022-08-26 PROCEDURE — 80048 BASIC METABOLIC PNL TOTAL CA: CPT

## 2022-08-26 PROCEDURE — 1200000000 HC SEMI PRIVATE

## 2022-08-26 PROCEDURE — 2500000003 HC RX 250 WO HCPCS

## 2022-08-26 PROCEDURE — 86140 C-REACTIVE PROTEIN: CPT

## 2022-08-26 PROCEDURE — 7100000000 HC PACU RECOVERY - FIRST 15 MIN: Performed by: ORTHOPAEDIC SURGERY

## 2022-08-26 PROCEDURE — 2580000003 HC RX 258

## 2022-08-26 PROCEDURE — 6360000002 HC RX W HCPCS: Performed by: ORTHOPAEDIC SURGERY

## 2022-08-26 PROCEDURE — 85027 COMPLETE CBC AUTOMATED: CPT

## 2022-08-26 PROCEDURE — 99226 PR SBSQ OBSERVATION CARE/DAY 35 MINUTES: CPT | Performed by: INTERNAL MEDICINE

## 2022-08-26 PROCEDURE — 3600000012 HC SURGERY LEVEL 2 ADDTL 15MIN: Performed by: ORTHOPAEDIC SURGERY

## 2022-08-26 PROCEDURE — 3700000000 HC ANESTHESIA ATTENDED CARE: Performed by: ORTHOPAEDIC SURGERY

## 2022-08-26 PROCEDURE — 87205 SMEAR GRAM STAIN: CPT

## 2022-08-26 PROCEDURE — 6360000002 HC RX W HCPCS: Performed by: INTERNAL MEDICINE

## 2022-08-26 PROCEDURE — 87077 CULTURE AEROBIC IDENTIFY: CPT

## 2022-08-26 PROCEDURE — 36415 COLL VENOUS BLD VENIPUNCTURE: CPT

## 2022-08-26 PROCEDURE — APPNB60 APP NON BILLABLE TIME 46-60 MINS: Performed by: PHYSICIAN ASSISTANT

## 2022-08-26 PROCEDURE — 2500000003 HC RX 250 WO HCPCS: Performed by: INTERNAL MEDICINE

## 2022-08-26 PROCEDURE — 3600000002 HC SURGERY LEVEL 2 BASE: Performed by: ORTHOPAEDIC SURGERY

## 2022-08-26 RX ORDER — PROPOFOL 10 MG/ML
INJECTION, EMULSION INTRAVENOUS PRN
Status: DISCONTINUED | OUTPATIENT
Start: 2022-08-26 | End: 2022-08-26 | Stop reason: SDUPTHER

## 2022-08-26 RX ORDER — DEXAMETHASONE SODIUM PHOSPHATE 4 MG/ML
INJECTION, SOLUTION INTRA-ARTICULAR; INTRALESIONAL; INTRAMUSCULAR; INTRAVENOUS; SOFT TISSUE PRN
Status: DISCONTINUED | OUTPATIENT
Start: 2022-08-26 | End: 2022-08-26 | Stop reason: SDUPTHER

## 2022-08-26 RX ORDER — SODIUM CHLORIDE 0.9 % (FLUSH) 0.9 %
5-40 SYRINGE (ML) INJECTION EVERY 12 HOURS SCHEDULED
Status: DISCONTINUED | OUTPATIENT
Start: 2022-08-26 | End: 2022-08-27 | Stop reason: HOSPADM

## 2022-08-26 RX ORDER — SODIUM CHLORIDE 9 MG/ML
INJECTION, SOLUTION INTRAVENOUS PRN
Status: DISCONTINUED | OUTPATIENT
Start: 2022-08-26 | End: 2022-08-27 | Stop reason: HOSPADM

## 2022-08-26 RX ORDER — LABETALOL HYDROCHLORIDE 5 MG/ML
5 INJECTION, SOLUTION INTRAVENOUS EVERY 10 MIN PRN
Status: DISCONTINUED | OUTPATIENT
Start: 2022-08-26 | End: 2022-08-26 | Stop reason: HOSPADM

## 2022-08-26 RX ORDER — SODIUM CHLORIDE 9 MG/ML
INJECTION, SOLUTION INTRAVENOUS PRN
Status: DISCONTINUED | OUTPATIENT
Start: 2022-08-26 | End: 2022-08-26 | Stop reason: HOSPADM

## 2022-08-26 RX ORDER — OXYCODONE HYDROCHLORIDE 5 MG/1
5 TABLET ORAL PRN
Status: DISCONTINUED | OUTPATIENT
Start: 2022-08-26 | End: 2022-08-26 | Stop reason: HOSPADM

## 2022-08-26 RX ORDER — ONDANSETRON 2 MG/ML
INJECTION INTRAMUSCULAR; INTRAVENOUS PRN
Status: DISCONTINUED | OUTPATIENT
Start: 2022-08-26 | End: 2022-08-26 | Stop reason: SDUPTHER

## 2022-08-26 RX ORDER — MIDAZOLAM HYDROCHLORIDE 1 MG/ML
INJECTION INTRAMUSCULAR; INTRAVENOUS PRN
Status: DISCONTINUED | OUTPATIENT
Start: 2022-08-26 | End: 2022-08-26 | Stop reason: SDUPTHER

## 2022-08-26 RX ORDER — SODIUM CHLORIDE 0.9 % (FLUSH) 0.9 %
5-40 SYRINGE (ML) INJECTION PRN
Status: DISCONTINUED | OUTPATIENT
Start: 2022-08-26 | End: 2022-08-27 | Stop reason: HOSPADM

## 2022-08-26 RX ORDER — SODIUM CHLORIDE, SODIUM LACTATE, POTASSIUM CHLORIDE, CALCIUM CHLORIDE 600; 310; 30; 20 MG/100ML; MG/100ML; MG/100ML; MG/100ML
INJECTION, SOLUTION INTRAVENOUS CONTINUOUS PRN
Status: DISCONTINUED | OUTPATIENT
Start: 2022-08-26 | End: 2022-08-26 | Stop reason: SDUPTHER

## 2022-08-26 RX ORDER — ONDANSETRON 2 MG/ML
4 INJECTION INTRAMUSCULAR; INTRAVENOUS EVERY 6 HOURS PRN
Status: DISCONTINUED | OUTPATIENT
Start: 2022-08-26 | End: 2022-08-27 | Stop reason: HOSPADM

## 2022-08-26 RX ORDER — LIDOCAINE HYDROCHLORIDE 20 MG/ML
INJECTION, SOLUTION INFILTRATION; PERINEURAL PRN
Status: DISCONTINUED | OUTPATIENT
Start: 2022-08-26 | End: 2022-08-26 | Stop reason: SDUPTHER

## 2022-08-26 RX ORDER — DIPHENHYDRAMINE HYDROCHLORIDE 50 MG/ML
12.5 INJECTION INTRAMUSCULAR; INTRAVENOUS
Status: DISCONTINUED | OUTPATIENT
Start: 2022-08-26 | End: 2022-08-26 | Stop reason: HOSPADM

## 2022-08-26 RX ORDER — SODIUM CHLORIDE 0.9 % (FLUSH) 0.9 %
5-40 SYRINGE (ML) INJECTION PRN
Status: DISCONTINUED | OUTPATIENT
Start: 2022-08-26 | End: 2022-08-26 | Stop reason: HOSPADM

## 2022-08-26 RX ORDER — MEPERIDINE HYDROCHLORIDE 25 MG/ML
12.5 INJECTION INTRAMUSCULAR; INTRAVENOUS; SUBCUTANEOUS EVERY 5 MIN PRN
Status: DISCONTINUED | OUTPATIENT
Start: 2022-08-26 | End: 2022-08-26 | Stop reason: HOSPADM

## 2022-08-26 RX ORDER — ONDANSETRON 2 MG/ML
4 INJECTION INTRAMUSCULAR; INTRAVENOUS
Status: DISCONTINUED | OUTPATIENT
Start: 2022-08-26 | End: 2022-08-26 | Stop reason: HOSPADM

## 2022-08-26 RX ORDER — SODIUM CHLORIDE 0.9 % (FLUSH) 0.9 %
5-40 SYRINGE (ML) INJECTION EVERY 12 HOURS SCHEDULED
Status: DISCONTINUED | OUTPATIENT
Start: 2022-08-26 | End: 2022-08-26 | Stop reason: HOSPADM

## 2022-08-26 RX ORDER — ONDANSETRON 4 MG/1
4 TABLET, ORALLY DISINTEGRATING ORAL EVERY 8 HOURS PRN
Status: DISCONTINUED | OUTPATIENT
Start: 2022-08-26 | End: 2022-08-27 | Stop reason: HOSPADM

## 2022-08-26 RX ORDER — SODIUM CHLORIDE 9 MG/ML
INJECTION, SOLUTION INTRAVENOUS CONTINUOUS
Status: DISCONTINUED | OUTPATIENT
Start: 2022-08-26 | End: 2022-08-27 | Stop reason: HOSPADM

## 2022-08-26 RX ORDER — OXYCODONE HYDROCHLORIDE 5 MG/1
10 TABLET ORAL PRN
Status: DISCONTINUED | OUTPATIENT
Start: 2022-08-26 | End: 2022-08-26 | Stop reason: HOSPADM

## 2022-08-26 RX ORDER — FENTANYL CITRATE 50 UG/ML
INJECTION, SOLUTION INTRAMUSCULAR; INTRAVENOUS PRN
Status: DISCONTINUED | OUTPATIENT
Start: 2022-08-26 | End: 2022-08-26 | Stop reason: SDUPTHER

## 2022-08-26 RX ADMIN — METRONIDAZOLE 500 MG: 500 INJECTION, SOLUTION INTRAVENOUS at 16:55

## 2022-08-26 RX ADMIN — METRONIDAZOLE 500 MG: 500 INJECTION, SOLUTION INTRAVENOUS at 02:50

## 2022-08-26 RX ADMIN — DEXAMETHASONE SODIUM PHOSPHATE 8 MG: 4 INJECTION, SOLUTION INTRAMUSCULAR; INTRAVENOUS at 12:41

## 2022-08-26 RX ADMIN — GABAPENTIN 600 MG: 300 CAPSULE ORAL at 14:27

## 2022-08-26 RX ADMIN — MIDAZOLAM 2 MG: 1 INJECTION INTRAMUSCULAR; INTRAVENOUS at 12:31

## 2022-08-26 RX ADMIN — OXYCODONE HYDROCHLORIDE AND ACETAMINOPHEN 2 TABLET: 5; 325 TABLET ORAL at 05:39

## 2022-08-26 RX ADMIN — DOCUSATE SODIUM 100 MG: 100 CAPSULE, LIQUID FILLED ORAL at 14:27

## 2022-08-26 RX ADMIN — LIDOCAINE HYDROCHLORIDE 100 MG: 20 INJECTION, SOLUTION INFILTRATION; PERINEURAL at 12:37

## 2022-08-26 RX ADMIN — GABAPENTIN 600 MG: 300 CAPSULE ORAL at 20:28

## 2022-08-26 RX ADMIN — OXYCODONE HYDROCHLORIDE AND ACETAMINOPHEN 2 TABLET: 5; 325 TABLET ORAL at 23:09

## 2022-08-26 RX ADMIN — HYDROMORPHONE HYDROCHLORIDE 0.5 MG: 1 INJECTION, SOLUTION INTRAMUSCULAR; INTRAVENOUS; SUBCUTANEOUS at 13:53

## 2022-08-26 RX ADMIN — CEFEPIME 2000 MG: 2 INJECTION, POWDER, FOR SOLUTION INTRAVENOUS at 05:37

## 2022-08-26 RX ADMIN — ONDANSETRON HYDROCHLORIDE 4 MG: 2 INJECTION, SOLUTION INTRAMUSCULAR; INTRAVENOUS at 12:41

## 2022-08-26 RX ADMIN — METRONIDAZOLE 500 MG: 500 INJECTION, SOLUTION INTRAVENOUS at 10:35

## 2022-08-26 RX ADMIN — OXYCODONE HYDROCHLORIDE AND ACETAMINOPHEN 2 TABLET: 5; 325 TABLET ORAL at 16:49

## 2022-08-26 RX ADMIN — DOCUSATE SODIUM 100 MG: 100 CAPSULE, LIQUID FILLED ORAL at 20:28

## 2022-08-26 RX ADMIN — SODIUM CHLORIDE, POTASSIUM CHLORIDE, SODIUM LACTATE AND CALCIUM CHLORIDE: 600; 310; 30; 20 INJECTION, SOLUTION INTRAVENOUS at 12:34

## 2022-08-26 RX ADMIN — PROPOFOL 200 MG: 10 INJECTION, EMULSION INTRAVENOUS at 12:37

## 2022-08-26 RX ADMIN — VANCOMYCIN HYDROCHLORIDE 1750 MG: 10 INJECTION, POWDER, LYOPHILIZED, FOR SOLUTION INTRAVENOUS at 20:21

## 2022-08-26 RX ADMIN — CEFEPIME 2000 MG: 2 INJECTION, POWDER, FOR SOLUTION INTRAVENOUS at 18:27

## 2022-08-26 RX ADMIN — SODIUM CHLORIDE: 9 INJECTION, SOLUTION INTRAVENOUS at 16:52

## 2022-08-26 RX ADMIN — GABAPENTIN 600 MG: 300 CAPSULE ORAL at 16:49

## 2022-08-26 RX ADMIN — FENTANYL CITRATE 50 MCG: 50 INJECTION INTRAMUSCULAR; INTRAVENOUS at 12:41

## 2022-08-26 RX ADMIN — POLYETHYLENE GLYCOL (3350) 17 G: 17 POWDER, FOR SOLUTION ORAL at 14:27

## 2022-08-26 ASSESSMENT — PAIN DESCRIPTION - ORIENTATION
ORIENTATION: RIGHT

## 2022-08-26 ASSESSMENT — PAIN SCALES - GENERAL
PAINLEVEL_OUTOF10: 8
PAINLEVEL_OUTOF10: 2
PAINLEVEL_OUTOF10: 7
PAINLEVEL_OUTOF10: 2
PAINLEVEL_OUTOF10: 7

## 2022-08-26 ASSESSMENT — PAIN - FUNCTIONAL ASSESSMENT
PAIN_FUNCTIONAL_ASSESSMENT: ACTIVITIES ARE NOT PREVENTED
PAIN_FUNCTIONAL_ASSESSMENT: PREVENTS OR INTERFERES SOME ACTIVE ACTIVITIES AND ADLS

## 2022-08-26 ASSESSMENT — PAIN DESCRIPTION - DESCRIPTORS
DESCRIPTORS: DISCOMFORT
DESCRIPTORS: ACHING
DESCRIPTORS: BURNING

## 2022-08-26 ASSESSMENT — PAIN DESCRIPTION - LOCATION
LOCATION: ELBOW
LOCATION: ARM

## 2022-08-26 ASSESSMENT — PAIN DESCRIPTION - PAIN TYPE: TYPE: SURGICAL PAIN

## 2022-08-26 NOTE — PROGRESS NOTES
Vancomycin Day: 5/7  Current Regimen: 1750 mg IV every 24 hours    Patient's labs, cultures, vitals, and vancomycin regimen reviewed.    SCr stable  U/O not measured/well documented  [facility] updated    Plan:   No change today  Kalli Whitfield Pharm MEGAN 8/26/202210:39 AM  .

## 2022-08-26 NOTE — CARE COORDINATION
INTERDISCIPLINARY PLAN OF CARE CONFERENCE    Date/Time: 8/26/2022 3:14 PM  Completed by: Herber Karimi RN, Case Management      Patient Name:  Mal Cox  YOB: 1960  Admitting Diagnosis: Cellulitis of right upper extremity [L03.113]     Admit Date/Time:  8/22/2022  8:02 PM    Chart reviewed. Interdisciplinary team contacted or reviewed plan related to patient progress and discharge plans. Disciplines included Case Management, Nursing, and Dietitian. Current Status: Inpatient 8/22/2022  PT/OT recommendation for discharge plan of care: n/a     Expected D/C Disposition:  Home  Confirmed plan with patient and/or family Yes confirmed with: patient    Discharge Plan Comments: Met with patient at bedside to discuss discharge needs and plan. Patient states plan is to return home. CM will continue to follow for DCP needs.     Home O2 in place on admit: No  Pt informed of need to bring portable home O2 tank on day of discharge for nursing to connect prior to leaving:  Not Indicated  Verbalized agreement/Understanding:  Not Indicated    Herber Karimi RN

## 2022-08-26 NOTE — ANESTHESIA POSTPROCEDURE EVALUATION
Department of Anesthesiology  Postprocedure Note    Patient: Pietro Ward  MRN: [id number]  YOB: 1960  Date of evaluation: 8/26/2022      Procedure Summary     Date: 08/26/22 Room / Location: [facility]    Anesthesia Start: 1233 Anesthesia Stop: 9067    Procedure: INCISION AND DRAINAGE RIGHT ANTECUBITAL FOSSA ABSCESS (Right: Arm Lower) Diagnosis:       Abscess of right elbow      (Abscess of right elbow [L02.413])    Surgeons: Sy Pool MD Responsible Provider: Arik Diallo MD    Anesthesia Type: general ASA Status: 2          Anesthesia Type: No value filed.     Reyes Phase I: Reyes Score: 9    Reyes Phase II:        Anesthesia Post Evaluation    Comments: Postoperative Anesthesia Note    Name:    Pietro Ward  MRN:      [id number]    Patient Vitals in the past 12 hrs:  08/26/22 1455, BP:114/69, Temp:96.8 °F (36 °C), Temp src:Oral, Pulse:78, Resp:18, SpO2:97 %  08/26/22 1415, BP:122/75, Temp:96.9 °F (36.1 °C), Temp src:Oral, Pulse:76, Resp:18, SpO2:96 %  08/26/22 1400, BP:121/76, Pulse:74, Resp:20, SpO2:93 %  08/26/22 1355, BP:128/81, Pulse:76, Resp:17, SpO2:95 %  08/26/22 1350, BP:128/81, Pulse:78, Resp:16, SpO2:95 %  08/26/22 1345, BP:128/81, Pulse:77, Resp:14, SpO2:94 %  08/26/22 1340, BP:118/80, Temp:97.6 °F (36.4 °C), Temp src:Temporal, Pulse:82, Resp:20, SpO2:94 %  08/26/22 1335, BP:122/77, Pulse:82, Resp:15, SpO2:96 %  08/26/22 1331, Temp:97.7 °F (36.5 °C), Temp src:Temporal  08/26/22 1330, BP:123/74, Pulse:66, Resp:16, SpO2:94 %  08/26/22 1026, BP:93/61, Temp:97.5 °F (36.4 °C), Temp src:Oral, Pulse:75, Resp:18, SpO2:96 %  08/26/22 0539, Resp:18  08/26/22 0515, BP:105/68  08/26/22 0445, BP:105/64, Pulse:78  08/26/22 0315, BP:(!) 101/59     LABS:    CBC  Lab Results       Component                Value               Date/Time                  WBC                      8.0                 08/26/2022 05:29 AM        HGB                      13.0 (L) 08/26/2022 05:29 AM        HCT                      38.1 (L)            08/26/2022 05:29 AM        PLT                      248                 08/26/2022 05:29 AM   RENAL  Lab Results       Component                Value               Date/Time                  NA                       139                 08/26/2022 05:29 AM        K                        4.3                 08/26/2022 05:29 AM        K                        3.7                 08/23/2022 05:41 AM        CL                       104                 08/26/2022 05:29 AM        CO2                      25                  08/26/2022 05:29 AM        BUN                      9                   08/26/2022 05:29 AM        CREATININE               1.0                 08/26/2022 05:29 AM        GLUCOSE                  109 (H)             08/26/2022 05:29 AM   COAGS  Lab Results       Component                Value               Date/Time                  PROTIME                  12.1                12/20/2012 06:30 PM        INR                      1.07                12/20/2012 06:30 PM     Intake & Output:  @73IWDO@    Nausea & Vomiting:  No    Level of Consciousness:  Awake    Pain Assessment:  Adequate analgesia    Anesthesia Complications:  No apparent anesthetic complications    SUMMARY      Vital signs stable  OK to discharge from Stage I post anesthesia care.   Care transferred from Anesthesiology department on discharge from perioperative area

## 2022-08-26 NOTE — PROGRESS NOTES
Patient admitted from OR to PACU. Bedside report received. Patient immediately hooked up to heart monitor. Amy Ibarra RN

## 2022-08-26 NOTE — FLOWSHEET NOTE
AM assessment completed. See flowsheet. A/Ox4. LCTAB. Medications taken without difficulty. BS active x4. No c/o n/v/d. Cont of B&B. Non-pitting edema noted to RUE. Patient c/o R arm pain 8/10. R arm noted with redness and warmth. Bed locked and in low position. Call light in reach.      08/26/22 1026   Vital Signs   Temp 97.5 °F (36.4 °C)   Temp Source Oral   Heart Rate 75   Heart Rate Source Monitor   Resp 18   BP 93/61   BP Location Left upper arm   BP Method Automatic   MAP (Calculated) 71.67   Patient Position Semi fowlers   Level of Consciousness 0   MEWS Score 2   Pain Assessment   Pain Assessment 0-10   Pain Level 8   Pain Location Arm   Pain Orientation Right   Pain Descriptors Burning   Oxygen Therapy   SpO2 96 %   O2 Device None (Room air)

## 2022-08-26 NOTE — PROGRESS NOTES
Report given to [facility], [place], operated by [facility]. Shikha Santiago RN

## 2022-08-26 NOTE — PROGRESS NOTES
distress, appears stated age and cooperative. HEENT: Pupils equal, round, and reactive to light. Conjunctivae/corneas clear. Neck: Supple, with full range of motion. No jugular venous distention. Trachea midline. Respiratory:  Normal respiratory effort. Clear to auscultation, bilaterally without Rales/Wheezes/Rhonchi. Cardiovascular: Regular rate and rhythm with normal S1/S2 without murmurs, rubs or gallops. Abdomen: Soft, non-tender, non-distended with normal bowel sounds. Musculoskeletal:     R proximal forearm and distal arm well demarcated area of erythema and erysipelas. Antecubital fossa induration albeit without fluctuance. No clearly visible penetrating skin defect or puncture wound. No needle marks. Extremity neurovascular intact and wrist and hand and shoulder and elbow appear spared at this time. Neurologic:  Neurovascularly intact without any focal sensory/motor deficits. Cranial nerves: II-XII intact, grossly non-focal.  Psychiatric: Alert and oriented, thought content appropriate, normal insight  Capillary Refill: Brisk,3 seconds, normal   Peripheral Pulses: +2 palpable, equal bilaterally       Labs:   Recent Labs     08/24/22  0528 08/25/22  0545 08/26/22  0529   WBC 7.6 7.8 8.0   HGB 13.9 12.8* 13.0*   HCT 42.6 38.1* 38.1*    216 248       Recent Labs     08/24/22  0527 08/25/22  0545 08/26/22  0529    140 139   K 4.3 4.3 4.3    104 104   CO2 23 26 25   BUN 12 10 9   CREATININE 1.0 0.9 1.0   CALCIUM 8.6 8.7 8.8       No results for input(s): AST, ALT, BILIDIR, BILITOT, ALKPHOS in the last 72 hours. No results for input(s): INR in the last 72 hours. No results for input(s): Wayna Khat in the last 72 hours.     Urinalysis:      Lab Results   Component Value Date/Time    NITRU Neg 12/20/2012 07:15 PM    BLOODU Neg 12/20/2012 07:15 PM    SPECGRAV 1.025 12/20/2012 07:15 PM    GLUCOSEU Neg 12/20/2012 07:15 PM       Radiology:  CT ELBOW RIGHT W CONTRAST   Final Result   1. Subcutaneous fat stranding about the elbow compatible with cellulitis. No   abscess or soft tissue gas. 2. No acute osseous abnormality. XR ELBOW RIGHT (MIN 3 VIEWS)   Final Result   Unremarkable right elbow. Assessment/Plan:    Active Hospital Problems    Diagnosis     Cellulitis of right upper extremity [B41.662]      Priority: Medium         Cellulitis with Erysipelas R forearm and arm crossing the elbow joint without joint involvement. Growing area of induration with likely early abscess R antecubital fossa. Cont cefepime and vanc. Ice and extremity elevation. Ok to d/c IVF. Monitor antecubital fossa for abscess formation   - ortho involved   - CT reviewed - no abscess at this time. - cont IV abx and add flagyl for anaerobic coverage. - still without signs of improvement. - sed rate and CRP markedly elevated,.   - plan OR with orthopedics team today for I&D.                  DVT Prophylaxis: lovenox  Diet: Diet NPO  Code Status: Full Code      Dispo - cc    Pricila Bagley MD

## 2022-08-26 NOTE — PROGRESS NOTES
Patient transferred via bed in stable condition with all belongings to room 219. Diamond Valencia RN

## 2022-08-27 ENCOUNTER — TELEPHONE (OUTPATIENT)
Dept: ORTHOPEDIC SURGERY | Age: 62
End: 2022-08-27

## 2022-08-27 VITALS
BODY MASS INDEX: 24.71 KG/M2 | TEMPERATURE: 96.6 F | OXYGEN SATURATION: 95 % | HEART RATE: 92 BPM | DIASTOLIC BLOOD PRESSURE: 58 MMHG | RESPIRATION RATE: 16 BRPM | SYSTOLIC BLOOD PRESSURE: 104 MMHG | HEIGHT: 70 IN | WEIGHT: 172.6 LBS

## 2022-08-27 LAB
ANION GAP SERPL CALCULATED.3IONS-SCNC: 14 MMOL/L (ref 3–16)
BUN BLDV-MCNC: 14 MG/DL (ref 7–20)
CALCIUM SERPL-MCNC: 8.9 MG/DL (ref 8.3–10.6)
CHLORIDE BLD-SCNC: 105 MMOL/L (ref 99–110)
CO2: 18 MMOL/L (ref 21–32)
CREAT SERPL-MCNC: 0.7 MG/DL (ref 0.8–1.3)
GFR AFRICAN AMERICAN: >60
GFR NON-AFRICAN AMERICAN: >60
GLUCOSE BLD-MCNC: 128 MG/DL (ref 70–99)
HCT VFR BLD CALC: 45.6 % (ref 40.5–52.5)
HEMOGLOBIN: 14.5 G/DL (ref 13.5–17.5)
MCH RBC QN AUTO: 32.1 PG (ref 26–34)
MCHC RBC AUTO-ENTMCNC: 31.7 G/DL (ref 31–36)
MCV RBC AUTO: 101.2 FL (ref 80–100)
PDW BLD-RTO: 15.7 % (ref 12.4–15.4)
PLATELET # BLD: 234 K/UL (ref 135–450)
PMV BLD AUTO: 6.8 FL (ref 5–10.5)
POTASSIUM SERPL-SCNC: 5 MMOL/L (ref 3.5–5.1)
RBC # BLD: 4.51 M/UL (ref 4.2–5.9)
SODIUM BLD-SCNC: 137 MMOL/L (ref 136–145)
WBC # BLD: 9.6 K/UL (ref 4–11)

## 2022-08-27 PROCEDURE — 6360000002 HC RX W HCPCS: Performed by: ORTHOPAEDIC SURGERY

## 2022-08-27 PROCEDURE — 80048 BASIC METABOLIC PNL TOTAL CA: CPT

## 2022-08-27 PROCEDURE — 36415 COLL VENOUS BLD VENIPUNCTURE: CPT

## 2022-08-27 PROCEDURE — 85027 COMPLETE CBC AUTOMATED: CPT

## 2022-08-27 PROCEDURE — 2580000003 HC RX 258: Performed by: ORTHOPAEDIC SURGERY

## 2022-08-27 PROCEDURE — 2500000003 HC RX 250 WO HCPCS: Performed by: ORTHOPAEDIC SURGERY

## 2022-08-27 RX ADMIN — CEFEPIME 2000 MG: 2 INJECTION, POWDER, FOR SOLUTION INTRAVENOUS at 05:08

## 2022-08-27 RX ADMIN — METRONIDAZOLE 500 MG: 500 INJECTION, SOLUTION INTRAVENOUS at 01:19
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
## 2022-08-27 NOTE — OP NOTE
Operative Note      Patient: Danny Madsen  YOB: 1960  MRN: [id number]    Date of Procedure: 8/26/2022    Pre-Op Diagnosis: Abscess of right elbow [I02.964]    Post-Op Diagnosis: Same       Procedure(s):  INCISION AND DRAINAGE RIGHT ANTECUBITAL FOSSA ABSCESS    Surgeon(s):  Robbie Bentley MD    Assistant:   Surgical Assistant: Gentry Kaplan    Anesthesia: General    Estimated Blood Loss (mL): less than 353     Complications: None    Specimens:   ID Type Source Tests Collected by Time Destination   1 : ABSCESS  RIGHT ELBOW Specimen Arm CULTURE, SURGICAL Robbie Bentley MD 8/26/2022 1306      Tourniquet time: 8 mins @ 250mmHg  Implants:  * No implants in log *      Drains: * No LDAs found *    Findings: 10cc gross purulence in subcutaneous tissue    Detailed Description of Procedure: The patient was positively identified in the preoperative holding area by the attending surgeon. Informed consent was verified and placed on the chart. The right upper extremity was marked appropriately. The patient was then taken to the operating room by the anesthesia team.  General anesthesia was induced. Antibiotics were redosed as appropriate. A nonsterile tourniquet was placed high on the right arm. Right upper extremity was examined. There was induration about the antecubital fossa both proximally and distally. There was a area of focal consolidation with apparent subcutaneous purulence which had developed overnight. Right upper extremity was prepped and draped in a standard sterile fashion. A timeout was held to verify the correct patient, operative site, laterality, and procedure. Everyone in the room was in agreement. The tourniquet was inflated without exsanguinating the arm. A curvilinear incision was made overlying the antecubital fossa just medial to the midline in the area of maximal fluctuance/induration. Dissection was carried sharply through the skin.   There was immediate return of 10 cc gross purulence within the subcutaneous tissue. Blunt dissection was carried out using hemostat and attempt to break up any loculations or identify additional abscess cavities. The fascia of the volar forearm was bluntly split longitudinally in line with its fibers using hemostat. There was no infection deep to the fascia. At this point, cultures were obtained. Next, the wound was copiously irrigated with sterile saline solution via cystoscopy tubing. The tourniquet was let down and hemostasis was ensured. The wound was then closed in a layered fashion with 3-0 Monocryl and 3-0 nylon suture. The wound was anesthetized with Marcaine half percent plain. A sterile dressing was applied. The patient was then awakened from general anesthesia and transitioned back to the hospital bed. He was taken to the postoperative recovery area in apparent stable condition. There were no immediate complications. All sponge and needle counts were correct. Postoperative plan:  WBAT RUE  Elbow motion as tolerated  Ice/elevation to decrease swelling  Continue broad-spectrum IV antibiotics pending cultures. Follow-up intraoperative cultures. Pain control  Plan Daily dressing changes beginning tomorrow. Anticipate inpatient stay on IV antibiotics x2 days postoperatively while erythema resolves before transition to oral antibiotics.       Electronically signed by Chicho Victoria MD on 8/26/2022 at 11:27 PM

## 2022-08-27 NOTE — FLOWSHEET NOTE
08/26/22 2031   Vital Signs   Temp 97.5 °F (36.4 °C)   Temp Source Oral   Heart Rate Source Monitor   Resp 16   /74   BP Location Left upper arm   MAP (Calculated) 94   Patient Position Standing   Level of Consciousness 0   Oxygen Therapy   SpO2 93 %   O2 Device None (Room air)   PM assessment complete. Medications given as ordered. Vital signs stable. Pt ambulating in room. Neurovasc intact to RUE, radial pulse +3, cap refill brisk. Encouraged patient to elevate arm as much as possible with pillow support. Plan of care reviewed. No other needs identified at this time.

## 2022-08-27 NOTE — TELEPHONE ENCOUNTER
Attempted to review chart postop. Per notes, patient left AMA. He is POD#1 s/p I&D R antecubital abscess. I was not contacted regarding this at the time he was discharging. Patient should at a minimum be on oral antibiotics. I left a voicemail. Will try again later.

## 2022-08-27 NOTE — PROGRESS NOTES
RN in room to take patient vitals, patient sleepy, not awakening when RN obtains BP, temp. BP read 86/43, RN then tried to awaken patient and he was slow to arouse, rubbing on patients chest awoke patient, but he believed he was still in a dream reaching into the air and grabbing this Rns leg, asking where the girl went and then began to cry. Retake of patients BP read 104/58 when awake. Patient alert, aware he is in hospital but is talking of his very surreal dream and thinking it was real. Pupils also pinpoint. RN asked if patient had taken any meds/drugs, patient became offended and adamantly denies. Patient asks for coffee, was given coffee, and began to walk around unit. Telesitter was placed outside his doorway to ensure he was no longer trying to smoke in waiting room (earlier during his walk the waiting room area was filled with cigarette smoke during the time patient was ambulating in halls and seen coming from this area). Patient then calls out and says he wants his IV taken out and wants to leave. Clinical made aware pt desires to leave, clarified patient is ok to leave in his current state (now alert and oriented after awakening). Surjit Tobar, says ok for patient to leave as long as alert and oriented. Upon RN entering room to explain AMA form and remove PIV, patient became belligerent and cursing at RN to remove PIV now. Pt was instructed to calm down and sit to have IV removed. PIV was removed. Pt also ripped off his ace wrap and bandage and threw on floor. AMA witnessed by [id number]. charge RN. Pt walked off unit. Clincal RN and nocturnist notified.

## 2022-08-27 NOTE — PLAN OF CARE
Problem: Discharge Planning  Goal: Discharge to home or other facility with appropriate resources  8/26/2022 2153 by Antelmo Fitzpatrick RN  Outcome: Progressing  8/26/2022 1418 by Nino Sarabia RN  Outcome: Progressing     Problem: Pain  Goal: Verbalizes/displays adequate comfort level or baseline comfort level  8/26/2022 2153 by Antelmo Fitzpatrick RN  Outcome: Progressing  8/26/2022 1418 by Nino Sarabia RN  Outcome: Progressing     Problem: ABCDS Injury Assessment  Goal: Absence of physical injury  8/26/2022 2153 by Antelmo Fitzpatrick RN  Outcome: Progressing  8/26/2022 1418 by Nino Sarabia RN  Outcome: Progressing     Problem: ABCDS Injury Assessment  Goal: Absence of physical injury  8/26/2022 2153 by Antelmo Fitzpatrick RN  Outcome: Progressing  8/26/2022 1418 by Nino Sarabia RN  Outcome: Progressing

## 2022-08-31 LAB
ANAEROBIC CULTURE: ABNORMAL
CULTURE SURGICAL: ABNORMAL
GRAM STAIN RESULT: ABNORMAL
ORGANISM: ABNORMAL

## 2022-09-07 ENCOUNTER — HOSPITAL ENCOUNTER (INPATIENT)
Age: 62
LOS: 1 days | Discharge: HOME | DRG: 813 | End: 2022-09-09
Attending: EMERGENCY MEDICINE | Admitting: HOSPITALIST
Payer: COMMERCIAL

## 2022-09-07 DIAGNOSIS — S41.101D OPEN ARM WOUND, RIGHT, SUBSEQUENT ENCOUNTER: ICD-10-CM

## 2022-09-07 DIAGNOSIS — R41.82 ALTERED MENTAL STATUS, UNSPECIFIED ALTERED MENTAL STATUS TYPE: Primary | ICD-10-CM

## 2022-09-07 DIAGNOSIS — T42.4X1A BENZODIAZEPINE OVERDOSE, ACCIDENTAL OR UNINTENTIONAL, INITIAL ENCOUNTER: ICD-10-CM

## 2022-09-07 LAB
BASE EXCESS VENOUS: 0.2 MMOL/L (ref -3–3)
BASOPHILS ABSOLUTE: 0.1 K/UL (ref 0–0.2)
BASOPHILS RELATIVE PERCENT: 0.9 %
CARBOXYHEMOGLOBIN: 4.8 % (ref 0–1.5)
EOSINOPHILS ABSOLUTE: 0.2 K/UL (ref 0–0.6)
EOSINOPHILS RELATIVE PERCENT: 2.5 %
HCO3 VENOUS: 25.8 MMOL/L (ref 23–29)
HCT VFR BLD CALC: 38.9 % (ref 40.5–52.5)
HEMOGLOBIN: 13.1 G/DL (ref 13.5–17.5)
LACTIC ACID: 1.4 MMOL/L (ref 0.4–2)
LYMPHOCYTES ABSOLUTE: 2.2 K/UL (ref 1–5.1)
LYMPHOCYTES RELATIVE PERCENT: 27.3 %
MCH RBC QN AUTO: 31.7 PG (ref 26–34)
MCHC RBC AUTO-ENTMCNC: 33.6 G/DL (ref 31–36)
MCV RBC AUTO: 94.5 FL (ref 80–100)
METHEMOGLOBIN VENOUS: 0.3 %
MONOCYTES ABSOLUTE: 0.6 K/UL (ref 0–1.3)
MONOCYTES RELATIVE PERCENT: 7.8 %
NEUTROPHILS ABSOLUTE: 5 K/UL (ref 1.7–7.7)
NEUTROPHILS RELATIVE PERCENT: 61.5 %
O2 SAT, VEN: 80 %
O2 THERAPY: ABNORMAL
PCO2, VEN: 45.3 MMHG (ref 40–50)
PDW BLD-RTO: 15.4 % (ref 12.4–15.4)
PH VENOUS: 7.37 (ref 7.35–7.45)
PLATELET # BLD: 398 K/UL (ref 135–450)
PMV BLD AUTO: 6.5 FL (ref 5–10.5)
PO2, VEN: 45.2 MMHG (ref 25–40)
RBC # BLD: 4.12 M/UL (ref 4.2–5.9)
TCO2 CALC VENOUS: 27 MMOL/L
WBC # BLD: 8.1 K/UL (ref 4–11)

## 2022-09-07 PROCEDURE — 80143 DRUG ASSAY ACETAMINOPHEN: CPT

## 2022-09-07 PROCEDURE — 80179 DRUG ASSAY SALICYLATE: CPT

## 2022-09-07 PROCEDURE — 80053 COMPREHEN METABOLIC PANEL: CPT

## 2022-09-07 PROCEDURE — 82803 BLOOD GASES ANY COMBINATION: CPT

## 2022-09-07 PROCEDURE — 96365 THER/PROPH/DIAG IV INF INIT: CPT

## 2022-09-07 PROCEDURE — 84484 ASSAY OF TROPONIN QUANT: CPT

## 2022-09-07 PROCEDURE — 36415 COLL VENOUS BLD VENIPUNCTURE: CPT

## 2022-09-07 PROCEDURE — 96375 TX/PRO/DX INJ NEW DRUG ADDON: CPT

## 2022-09-07 PROCEDURE — 83605 ASSAY OF LACTIC ACID: CPT

## 2022-09-07 PROCEDURE — 85025 COMPLETE CBC W/AUTO DIFF WBC: CPT

## 2022-09-07 PROCEDURE — 82077 ASSAY SPEC XCP UR&BREATH IA: CPT

## 2022-09-07 PROCEDURE — 99285 EMERGENCY DEPT VISIT HI MDM: CPT

## 2022-09-07 ASSESSMENT — PAIN SCALES - WONG BAKER: WONGBAKER_NUMERICALRESPONSE: 0

## 2022-09-07 ASSESSMENT — PAIN - FUNCTIONAL ASSESSMENT: PAIN_FUNCTIONAL_ASSESSMENT: WONG-BAKER FACES

## 2022-09-08 ENCOUNTER — APPOINTMENT (OUTPATIENT)
Dept: GENERAL RADIOLOGY | Age: 62
DRG: 813 | End: 2022-09-08
Payer: COMMERCIAL

## 2022-09-08 ENCOUNTER — APPOINTMENT (OUTPATIENT)
Dept: CT IMAGING | Age: 62
DRG: 813 | End: 2022-09-08
Payer: COMMERCIAL

## 2022-09-08 ENCOUNTER — ANESTHESIA EVENT (OUTPATIENT)
Dept: OPERATING ROOM | Age: 62
DRG: 813 | End: 2022-09-08
Payer: COMMERCIAL

## 2022-09-08 PROBLEM — R41.82 ALTERED MENTAL STATUS: Status: ACTIVE | Noted: 2022-09-08

## 2022-09-08 PROBLEM — S41.101D OPEN ARM WOUND, RIGHT, SUBSEQUENT ENCOUNTER: Status: ACTIVE | Noted: 2022-09-08

## 2022-09-08 PROBLEM — L03.90 CELLULITIS: Status: ACTIVE | Noted: 2022-09-08

## 2022-09-08 PROBLEM — T81.31XA DEHISCENCE OF SURGICAL WOUND: Status: ACTIVE | Noted: 2022-09-07

## 2022-09-08 LAB
A/G RATIO: 1.6 (ref 1.1–2.2)
ACETAMINOPHEN LEVEL: <5 UG/ML (ref 10–30)
ALBUMIN SERPL-MCNC: 4.2 G/DL (ref 3.4–5)
ALP BLD-CCNC: 100 U/L (ref 40–129)
ALT SERPL-CCNC: 13 U/L (ref 10–40)
AMPHETAMINE SCREEN, URINE: POSITIVE
ANION GAP SERPL CALCULATED.3IONS-SCNC: 13 MMOL/L (ref 3–16)
AST SERPL-CCNC: 13 U/L (ref 15–37)
BARBITURATE SCREEN URINE: ABNORMAL
BENZODIAZEPINE SCREEN, URINE: POSITIVE
BILIRUB SERPL-MCNC: <0.2 MG/DL (ref 0–1)
BILIRUBIN URINE: NEGATIVE
BLOOD, URINE: NEGATIVE
BUN BLDV-MCNC: 13 MG/DL (ref 7–20)
CALCIUM SERPL-MCNC: 9 MG/DL (ref 8.3–10.6)
CANNABINOID SCREEN URINE: ABNORMAL
CHLORIDE BLD-SCNC: 102 MMOL/L (ref 99–110)
CLARITY: CLEAR
CO2: 26 MMOL/L (ref 21–32)
COCAINE METABOLITE SCREEN URINE: ABNORMAL
COLOR: YELLOW
CREAT SERPL-MCNC: 1 MG/DL (ref 0.8–1.3)
EKG ATRIAL RATE: 75 BPM
EKG DIAGNOSIS: NORMAL
EKG P AXIS: 70 DEGREES
EKG P-R INTERVAL: 162 MS
EKG Q-T INTERVAL: 384 MS
EKG QRS DURATION: 86 MS
EKG QTC CALCULATION (BAZETT): 428 MS
EKG R AXIS: 72 DEGREES
EKG T AXIS: 53 DEGREES
EKG VENTRICULAR RATE: 75 BPM
ETHANOL: NORMAL MG/DL (ref 0–0.08)
FENTANYL SCREEN, URINE: ABNORMAL
GFR AFRICAN AMERICAN: >60
GFR NON-AFRICAN AMERICAN: >60
GLUCOSE BLD-MCNC: 104 MG/DL (ref 70–99)
GLUCOSE URINE: NEGATIVE MG/DL
KETONES, URINE: NEGATIVE MG/DL
LEUKOCYTE ESTERASE, URINE: NEGATIVE
Lab: ABNORMAL
METHADONE SCREEN, URINE: ABNORMAL
MICROSCOPIC EXAMINATION: NORMAL
NITRITE, URINE: NEGATIVE
OPIATE SCREEN URINE: ABNORMAL
OXYCODONE URINE: ABNORMAL
PH UA: 6
PH UA: 6 (ref 5–8)
PHENCYCLIDINE SCREEN URINE: ABNORMAL
POTASSIUM REFLEX MAGNESIUM: 3.8 MMOL/L (ref 3.5–5.1)
PROTEIN UA: NEGATIVE MG/DL
SALICYLATE, SERUM: <0.3 MG/DL (ref 15–30)
SARS-COV-2, NAAT: NOT DETECTED
SODIUM BLD-SCNC: 141 MMOL/L (ref 136–145)
SPECIFIC GRAVITY UA: >=1.03 (ref 1–1.03)
TOTAL PROTEIN: 6.8 G/DL (ref 6.4–8.2)
TROPONIN: <0.01 NG/ML
URINE REFLEX TO CULTURE: NORMAL
URINE TYPE: NORMAL
UROBILINOGEN, URINE: 0.2 E.U./DL

## 2022-09-08 PROCEDURE — 80307 DRUG TEST PRSMV CHEM ANLYZR: CPT

## 2022-09-08 PROCEDURE — 2580000003 HC RX 258: Performed by: EMERGENCY MEDICINE

## 2022-09-08 PROCEDURE — 71045 X-RAY EXAM CHEST 1 VIEW: CPT

## 2022-09-08 PROCEDURE — 2500000003 HC RX 250 WO HCPCS: Performed by: INTERNAL MEDICINE

## 2022-09-08 PROCEDURE — 99219 PR INITIAL OBSERVATION CARE/DAY 50 MINUTES: CPT | Performed by: INTERNAL MEDICINE

## 2022-09-08 PROCEDURE — 6370000000 HC RX 637 (ALT 250 FOR IP): Performed by: HOSPITALIST

## 2022-09-08 PROCEDURE — 70450 CT HEAD/BRAIN W/O DYE: CPT

## 2022-09-08 PROCEDURE — 6360000002 HC RX W HCPCS: Performed by: EMERGENCY MEDICINE

## 2022-09-08 PROCEDURE — 6360000002 HC RX W HCPCS: Performed by: HOSPITALIST

## 2022-09-08 PROCEDURE — G0378 HOSPITAL OBSERVATION PER HR: HCPCS

## 2022-09-08 PROCEDURE — 81003 URINALYSIS AUTO W/O SCOPE: CPT

## 2022-09-08 PROCEDURE — 2580000003 HC RX 258: Performed by: HOSPITALIST

## 2022-09-08 PROCEDURE — 93005 ELECTROCARDIOGRAM TRACING: CPT | Performed by: EMERGENCY MEDICINE

## 2022-09-08 PROCEDURE — 87635 SARS-COV-2 COVID-19 AMP PRB: CPT

## 2022-09-08 PROCEDURE — 96365 THER/PROPH/DIAG IV INF INIT: CPT

## 2022-09-08 PROCEDURE — 93010 ELECTROCARDIOGRAM REPORT: CPT | Performed by: INTERNAL MEDICINE

## 2022-09-08 PROCEDURE — 96375 TX/PRO/DX INJ NEW DRUG ADDON: CPT

## 2022-09-08 PROCEDURE — 1200000000 HC SEMI PRIVATE

## 2022-09-08 RX ORDER — ACETAMINOPHEN 325 MG/1
650 TABLET ORAL EVERY 6 HOURS PRN
Status: DISCONTINUED | OUTPATIENT
Start: 2022-09-08 | End: 2022-09-09 | Stop reason: HOSPADM

## 2022-09-08 RX ORDER — GABAPENTIN 300 MG/1
600 CAPSULE ORAL 4 TIMES DAILY
Status: DISCONTINUED | OUTPATIENT
Start: 2022-09-08 | End: 2022-09-09 | Stop reason: HOSPADM

## 2022-09-08 RX ORDER — IBUPROFEN 800 MG/1
800 TABLET ORAL
Status: DISCONTINUED | OUTPATIENT
Start: 2022-09-08 | End: 2022-09-09 | Stop reason: HOSPADM

## 2022-09-08 RX ORDER — ONDANSETRON 4 MG/1
4 TABLET, ORALLY DISINTEGRATING ORAL EVERY 8 HOURS PRN
Status: DISCONTINUED | OUTPATIENT
Start: 2022-09-08 | End: 2022-09-09 | Stop reason: HOSPADM

## 2022-09-08 RX ORDER — CLINDAMYCIN PHOSPHATE 300 MG/50ML
300 INJECTION INTRAVENOUS EVERY 8 HOURS
Status: DISCONTINUED | OUTPATIENT
Start: 2022-09-08 | End: 2022-09-09 | Stop reason: HOSPADM

## 2022-09-08 RX ORDER — ENOXAPARIN SODIUM 100 MG/ML
40 INJECTION SUBCUTANEOUS DAILY
Status: DISCONTINUED | OUTPATIENT
Start: 2022-09-08 | End: 2022-09-09

## 2022-09-08 RX ORDER — POLYETHYLENE GLYCOL 3350 17 G/17G
17 POWDER, FOR SOLUTION ORAL DAILY PRN
Status: DISCONTINUED | OUTPATIENT
Start: 2022-09-08 | End: 2022-09-09 | Stop reason: HOSPADM

## 2022-09-08 RX ORDER — SODIUM CHLORIDE 0.9 % (FLUSH) 0.9 %
5-40 SYRINGE (ML) INJECTION PRN
Status: DISCONTINUED | OUTPATIENT
Start: 2022-09-08 | End: 2022-09-09 | Stop reason: HOSPADM

## 2022-09-08 RX ORDER — 0.9 % SODIUM CHLORIDE 0.9 %
1000 INTRAVENOUS SOLUTION INTRAVENOUS ONCE
Status: COMPLETED | OUTPATIENT
Start: 2022-09-08 | End: 2022-09-08

## 2022-09-08 RX ORDER — NALOXONE HYDROCHLORIDE 0.4 MG/ML
0.4 INJECTION, SOLUTION INTRAMUSCULAR; INTRAVENOUS; SUBCUTANEOUS ONCE
Status: DISCONTINUED | OUTPATIENT
Start: 2022-09-08 | End: 2022-09-08

## 2022-09-08 RX ORDER — SODIUM CHLORIDE 0.9 % (FLUSH) 0.9 %
5-40 SYRINGE (ML) INJECTION EVERY 12 HOURS SCHEDULED
Status: CANCELLED | OUTPATIENT
Start: 2022-09-08

## 2022-09-08 RX ORDER — SODIUM CHLORIDE 9 MG/ML
INJECTION, SOLUTION INTRAVENOUS PRN
Status: DISCONTINUED | OUTPATIENT
Start: 2022-09-08 | End: 2022-09-09 | Stop reason: HOSPADM

## 2022-09-08 RX ORDER — ONDANSETRON 2 MG/ML
4 INJECTION INTRAMUSCULAR; INTRAVENOUS EVERY 6 HOURS PRN
Status: DISCONTINUED | OUTPATIENT
Start: 2022-09-08 | End: 2022-09-09 | Stop reason: HOSPADM

## 2022-09-08 RX ORDER — SODIUM CHLORIDE, SODIUM LACTATE, POTASSIUM CHLORIDE, CALCIUM CHLORIDE 600; 310; 30; 20 MG/100ML; MG/100ML; MG/100ML; MG/100ML
INJECTION, SOLUTION INTRAVENOUS CONTINUOUS
Status: CANCELLED | OUTPATIENT
Start: 2022-09-08

## 2022-09-08 RX ORDER — SODIUM CHLORIDE 0.9 % (FLUSH) 0.9 %
5-40 SYRINGE (ML) INJECTION PRN
Status: CANCELLED | OUTPATIENT
Start: 2022-09-08

## 2022-09-08 RX ORDER — NALOXONE HYDROCHLORIDE 1 MG/ML
1 INJECTION INTRAMUSCULAR; INTRAVENOUS; SUBCUTANEOUS ONCE
Status: COMPLETED | OUTPATIENT
Start: 2022-09-08 | End: 2022-09-08

## 2022-09-08 RX ORDER — SODIUM CHLORIDE 9 MG/ML
INJECTION, SOLUTION INTRAVENOUS PRN
Status: CANCELLED | OUTPATIENT
Start: 2022-09-08

## 2022-09-08 RX ORDER — 0.9 % SODIUM CHLORIDE 0.9 %
1500 INTRAVENOUS SOLUTION INTRAVENOUS ONCE
Status: COMPLETED | OUTPATIENT
Start: 2022-09-08 | End: 2022-09-08

## 2022-09-08 RX ORDER — NALOXONE HYDROCHLORIDE 0.4 MG/ML
0.4 INJECTION, SOLUTION INTRAMUSCULAR; INTRAVENOUS; SUBCUTANEOUS ONCE
Status: COMPLETED | OUTPATIENT
Start: 2022-09-08 | End: 2022-09-08

## 2022-09-08 RX ORDER — SODIUM CHLORIDE 0.9 % (FLUSH) 0.9 %
5-40 SYRINGE (ML) INJECTION EVERY 12 HOURS SCHEDULED
Status: DISCONTINUED | OUTPATIENT
Start: 2022-09-08 | End: 2022-09-09 | Stop reason: HOSPADM

## 2022-09-08 RX ORDER — ACETAMINOPHEN 650 MG/1
650 SUPPOSITORY RECTAL EVERY 6 HOURS PRN
Status: DISCONTINUED | OUTPATIENT
Start: 2022-09-08 | End: 2022-09-09 | Stop reason: HOSPADM

## 2022-09-08 RX ADMIN — Medication 1500 MG: at 10:12

## 2022-09-08 RX ADMIN — SODIUM CHLORIDE 1500 ML: 9 INJECTION, SOLUTION INTRAVENOUS at 05:05

## 2022-09-08 RX ADMIN — GABAPENTIN 600 MG: 300 CAPSULE ORAL at 16:20

## 2022-09-08 RX ADMIN — CLINDAMYCIN PHOSPHATE 300 MG: 300 INJECTION, SOLUTION INTRAVENOUS at 18:14

## 2022-09-08 RX ADMIN — CLINDAMYCIN PHOSPHATE 300 MG: 300 INJECTION, SOLUTION INTRAVENOUS at 11:24

## 2022-09-08 RX ADMIN — NALOXONE HYDROCHLORIDE 1 MG: 1 INJECTION PARENTERAL at 00:38

## 2022-09-08 RX ADMIN — CEFTRIAXONE SODIUM 1000 MG: 1 INJECTION, POWDER, FOR SOLUTION INTRAMUSCULAR; INTRAVENOUS at 05:07

## 2022-09-08 RX ADMIN — NALOXONE HYDROCHLORIDE 0.4 MG: 0.4 INJECTION, SOLUTION INTRAMUSCULAR; INTRAVENOUS; SUBCUTANEOUS at 00:27

## 2022-09-08 RX ADMIN — IBUPROFEN 800 MG: 800 TABLET, FILM COATED ORAL at 16:20

## 2022-09-08 RX ADMIN — SODIUM CHLORIDE 1000 ML: 9 INJECTION, SOLUTION INTRAVENOUS at 00:26

## 2022-09-08 RX ADMIN — GABAPENTIN 600 MG: 300 CAPSULE ORAL at 10:19

## 2022-09-08 RX ADMIN — VANCOMYCIN HYDROCHLORIDE 1000 MG: 1 INJECTION, POWDER, LYOPHILIZED, FOR SOLUTION INTRAVENOUS at 05:40

## 2022-09-08 RX ADMIN — GABAPENTIN 600 MG: 300 CAPSULE ORAL at 21:20

## 2022-09-08 RX ADMIN — Medication 10 ML: at 21:23

## 2022-09-08 RX ADMIN — ACETAMINOPHEN 650 MG: 325 TABLET ORAL at 21:21

## 2022-09-08 RX ADMIN — IBUPROFEN 800 MG: 800 TABLET, FILM COATED ORAL at 10:18

## 2022-09-08 ASSESSMENT — LIFESTYLE VARIABLES
HOW MANY STANDARD DRINKS CONTAINING ALCOHOL DO YOU HAVE ON A TYPICAL DAY: PATIENT DECLINED
HOW OFTEN DO YOU HAVE A DRINK CONTAINING ALCOHOL: MONTHLY OR LESS

## 2022-09-08 ASSESSMENT — PAIN SCALES - GENERAL
PAINLEVEL_OUTOF10: 6
PAINLEVEL_OUTOF10: 0
PAINLEVEL_OUTOF10: 0

## 2022-09-08 ASSESSMENT — PAIN DESCRIPTION - LOCATION: LOCATION: ARM;BACK

## 2022-09-08 ASSESSMENT — PAIN DESCRIPTION - DESCRIPTORS: DESCRIPTORS: ACHING

## 2022-09-08 ASSESSMENT — PAIN DESCRIPTION - ORIENTATION: ORIENTATION: RIGHT;LOWER

## 2022-09-08 NOTE — PLAN OF CARE
Problem: Discharge Planning  Goal: Discharge to home or other facility with appropriate resources  Outcome: Progressing  Flowsheets (Taken 9/8/2022 8759)  Discharge to home or other facility with appropriate resources:   Identify barriers to discharge with patient and caregiver   Arrange for needed discharge resources and transportation as appropriate   Identify discharge learning needs (meds, wound care, etc)   Arrange for interpreters to assist at discharge as needed   Refer to discharge planning if patient needs post-hospital services based on physician order or complex needs related to functional status, cognitive ability or social support system     Problem: Safety - Adult  Goal: Free from fall injury  Outcome: Progressing     Problem: ABCDS Injury Assessment  Goal: Absence of physical injury  Outcome: Progressing     Problem: Chronic Conditions and Co-morbidities  Goal: Patient's chronic conditions and co-morbidity symptoms are monitored and maintained or improved  Outcome: Progressing

## 2022-09-08 NOTE — PROGRESS NOTES
Clinical Pharmacy Consult Note    Admit date: 9/7/22    Subjective/Objective:  63 yomale with PMHx that includes no pertinent PMH who is admitted with Im [facility] 89 is consulted to dose Vancomycin per Dr. Adelaida Soliman    Pertinent Medications:  Vancomycin 1500 mg x 1 bolus + 1000 mg every 12 hours -- day # 1    Recent Labs     09/07/22  2336      K 3.8      CO2 26   BUN 13   CREATININE 1.0       Estimated Creatinine Clearance: 82 mL/min (based on SCr of 1 mg/dL). Recent Labs     09/07/22  2336   WBC 8.1   HGB 13.1*   HCT 38.9*   MCV 94.5            Assessment/Plan:  1. SSTI:  vancomycin - day #1  Vancomycin  Will start 1500 mg x 1 bolus dose + 1000 mg every 12 hours based on AUC dosing   Renal function will be monitored closely and dosing will be adjusted as appropriate. Please call with any questions. Thank you for consulting pharmacy! Nusrat Morales, Pharm. D., BCPS  [phone]  9/8/2022 9:17 AM

## 2022-09-08 NOTE — PLAN OF CARE
Department of Orthopedic Surgery  Physician Assistant   Pre- Rounding Consult Note/Plan of Care Note      Reason for Consult:  right elbow recurrent abscess  Date of Service: 9/8/2022 2:07 PM    HISTORY OF PRESENT ILLNESS:                The patient is a 63 y.o. male who presents with above chief complaint. Pt recently underwent I&D of the right AC fossa for an abscess. Following surgery he signed out AMA from the hospital and did not return our follow up phone calls or come for a follow up visit. Last night he was found unresponsive in a vehicle and brought to the ED. Suspected OD from ED notes and positive drug screen. Was noted to have a dehisced wound where his I&D was performed and remaining sutures removed. Has had some serous drainage. Denies injecting anything in that area. No fevers at home. Apparently has been on clinda but not sure if he has taken as prescribed. Past Medical History:    History reviewed. No pertinent past medical history. Past Surgical History:        Procedure Laterality Date    ARM SURGERY Right 8/26/2022    INCISION AND DRAINAGE RIGHT ANTECUBITAL FOSSA ABSCESS performed by Gabriele Hussein MD at [address] [place] [address]           Medications Prior to Admission:   Prior to Admission medications    Medication Sig Start Date End Date Taking? Authorizing Provider   naproxen (NAPROSYN) 500 MG tablet Take 1 tablet by mouth 2 times daily as needed for Pain 5/11/22 5/21/22  Keerthi Tidwell APRN - CNP   gabapentin (NEURONTIN) 600 MG tablet Take 600 mg by mouth 4 times daily. Historical Provider, MD   ibuprofen (ADVIL;MOTRIN) 800 MG tablet Take 1 tablet by mouth 3 times daily (with meals) 1/13/21   Becky Page MD       Allergies:  Patient has no known allergies. Social History:    Tobacco:  reports that he has been smoking cigars. He has never used smokeless tobacco.   Alcohol:  reports current alcohol use of about 2.0 standard drinks per week.      Family History: Family history unknown: Yes       PHYSICAL EXAM:    MUSCULOSKELETAL:  RIGHT ELBOW:  redness present  warmth present  swelling present  tenderness present and noted antecubital fossa over prior incision. Granulation tissue present with serous drainage. Moderate induation present in the area without palpable fluctuance today. Sensation intact. Some erythema present as well. Good radial pulse. range of motion full at the elbow. DATA:    Admission weight: 172 lb (78 kg)  5' 10.5\" (179.1 cm)  VITALS:  /72   Pulse 60   Temp 97.5 °F (36.4 °C) (Oral)   Resp 16   Ht 5' 10.5\" (1.791 m)   Wt 174 lb 5 oz (79.1 kg)   SpO2 97%   BMI 24.66 kg/m²   CBC:   Recent Labs     09/07/22  2336   WBC 8.1   HGB 13.1*        BMP:    Recent Labs     09/07/22  2336      K 3.8      CO2 26   BUN 13   CREATININE 1.0   GLUCOSE 104*     INR: No results for input(s): INR in the last 72 hours. Radiology:   CT HEAD WO CONTRAST   Final Result   No acute intracranial hemorrhage, mass effect, midline shift, or   hydrocephalus. There are chronic ischemic changes in the white matter and   mild cortical volume loss. Fracture deformities the anterior right aspect of the nasal bones without a   focal hematoma. These may be chronic and correlate with point tenderness. XR CHEST PORTABLE   Final Result   The lungs are hypoinflated. Prominence of the pulmonary vasculature and   interstitial markings may be partly due to the hypoinflated state versus a   mild congestion. IMPRESSION/RECOMMENDATIONS:    Assessment: wound dehiscence right AC fossa at previous I&D site    Plan:  1) Patient will benefit from a repeat I&D of the elbow to ensure adequate resolution of abscess and encourage proper healing. Will need to continue on IV ABX at this time and likely can switch to PO prior to d/c. Will make NPO after MN. Consent obtained. Plan for OR tomorrow morning.       Full consult to follow    ELIOT Cosme

## 2022-09-08 NOTE — PROGRESS NOTES
Pt arrived to unit via w/c from Ed to room 228. Alert and oriented. Reviewed plan of care. Oriented to room and unit. Admission assessment complete. Patient is able to demonstrate the ability to move from a reclining position to an upright position within the recliner. 4 Eyes Skin Assessment     The patient is being assess for   Admission    I agree that 2 RN's have performed a thorough Head to Toe Skin Assessment on the patient. ALL assessment sites listed below have been assessed. Patient refused 4 eyes and Chlorhexidine bath. Areas assessed by both nurses:   [x]   Head, Face, and Ears   [x]   Shoulders, Back, and Chest, Abdomen  [x]   Arms, Elbows, and Hands   [x]   Coccyx, Sacrum, and Ischium  [x]   Legs, Feet, and Heels            **SHARE this note so that the co-signing nurse is able to place an eSignature**    Co-signer eSignature: Electronically signed by Aline Jennings RN on 9/8/22 at 10:10 AM EDT    Does the Patient have Skin Breakdown?   Yes L[facility] WOUND CARE was Initiated documentation include the Ermelinda-wound, Wound Assessment, Measurements, Dressing Treatment, Drainage, and Color\",          Didier Prevention initiated:  Yes   Wound Care Orders initiated:  Yes      [id number] nurse consulted for Pressure Injury (Stage 3,4, Unstageable, DTI, NWPT, Complex wounds)and New or Established Ostomies:  Yes      Primary Nurse eSignature: Electronically signed by Aline Jennings RN on 9/8/22 at 10:10 AM EDT

## 2022-09-08 NOTE — PROGRESS NOTES
Patient is trying to make a decision whether to stay or go. States his truck is towed which will cost a lot. Patient encouraged to stay for treatment and we will figure out where his truck is at. Patient states he does not know yet what he will do. States took a Xanax because someone he knew passed away and that is the last he remembers.

## 2022-09-08 NOTE — PLAN OF CARE
Problem: Discharge Planning  Goal: Discharge to home or other facility with appropriate resources  9/8/2022 1104 by Mariah Blackmon RN  Outcome: Progressing  9/8/2022 1007 by Mariah Blackmon RN  Outcome: Progressing  Flowsheets (Taken 9/8/2022 1812)  Discharge to home or other facility with appropriate resources:   Identify barriers to discharge with patient and caregiver   Arrange for needed discharge resources and transportation as appropriate   Identify discharge learning needs (meds, wound care, etc)   Arrange for interpreters to assist at discharge as needed   Refer to discharge planning if patient needs post-hospital services based on physician order or complex needs related to functional status, cognitive ability or social support system     Problem: Safety - Adult  Goal: Free from fall injury  9/8/2022 1104 by Mariah Blackmon RN  Outcome: Progressing  Flowsheets (Taken 9/8/2022 1103)  Free From Fall Injury:   Instruct family/caregiver on patient safety   Based on caregiver fall risk screen, instruct family/caregiver to ask for assistance with transferring infant if caregiver noted to have fall risk factors  9/8/2022 1007 by Mariah Blackmon RN  Outcome: Progressing     Problem: ABCDS Injury Assessment  Goal: Absence of physical injury  9/8/2022 1104 by Mariah Blackmon RN  Outcome: Progressing  Flowsheets (Taken 9/8/2022 1103)  Absence of Physical Injury: Implement safety measures based on patient assessment  9/8/2022 1007 by Mariah Blackmon RN  Outcome: Progressing     Problem: Chronic Conditions and Co-morbidities  Goal: Patient's chronic conditions and co-morbidity symptoms are monitored and maintained or improved  9/8/2022 1104 by Mariah Blackmon RN  Outcome: Progressing  9/8/2022 1007 by Mariah Blackmon RN  Outcome: Progressing

## 2022-09-08 NOTE — PROGRESS NOTES
Consult has been called to Naila cassidy  on 9/8/22. Spoke with Naila cassidy via [employer].  8:54 AM    Shannon Russo  9/8/202

## 2022-09-08 NOTE — PLAN OF CARE
Problem: Discharge Planning  Goal: Discharge to home or other facility with appropriate resources  Outcome: Progressing  Flowsheets (Taken 9/8/2022 1289)  Discharge to home or other facility with appropriate resources:   Identify barriers to discharge with patient and caregiver   Arrange for needed discharge resources and transportation as appropriate   Identify discharge learning needs (meds, wound care, etc)   Arrange for interpreters to assist at discharge as needed   Refer to discharge planning if patient needs post-hospital services based on physician order or complex needs related to functional status, cognitive ability or social support system     Problem: Safety - Adult  Goal: Free from fall injury  Outcome: Progressing     Problem: ABCDS Injury Assessment  Goal: Absence of physical injury  Outcome: Progressing     Problem: Chronic Conditions and Co-morbidities  Goal: Patient's chronic conditions and co-morbidity symptoms are monitored and maintained or improved  Outcome: Progressing
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
## 2022-09-08 NOTE — ED NOTES
Dr Jesus Manuel Gonzalez Paged at 1992 to speak with Dr. Marjo Goldmann  09/08/22 7565    Dr. Jesus Manuel Gonzalez returned call at [facility]  09/08/22 7444

## 2022-09-09 ENCOUNTER — ANESTHESIA (OUTPATIENT)
Dept: OPERATING ROOM | Age: 62
DRG: 813 | End: 2022-09-09
Payer: COMMERCIAL

## 2022-09-09 VITALS
OXYGEN SATURATION: 99 % | DIASTOLIC BLOOD PRESSURE: 70 MMHG | BODY MASS INDEX: 24.4 KG/M2 | RESPIRATION RATE: 16 BRPM | TEMPERATURE: 96.7 F | HEIGHT: 71 IN | HEART RATE: 71 BPM | WEIGHT: 174.31 LBS | SYSTOLIC BLOOD PRESSURE: 122 MMHG

## 2022-09-09 LAB
ANION GAP SERPL CALCULATED.3IONS-SCNC: 12 MMOL/L (ref 3–16)
BASOPHILS ABSOLUTE: 0.1 K/UL (ref 0–0.2)
BASOPHILS RELATIVE PERCENT: 1 %
BUN BLDV-MCNC: 9 MG/DL (ref 7–20)
CALCIUM SERPL-MCNC: 8.6 MG/DL (ref 8.3–10.6)
CHLORIDE BLD-SCNC: 106 MMOL/L (ref 99–110)
CO2: 21 MMOL/L (ref 21–32)
CREAT SERPL-MCNC: 0.8 MG/DL (ref 0.8–1.3)
EOSINOPHILS ABSOLUTE: 0.2 K/UL (ref 0–0.6)
EOSINOPHILS RELATIVE PERCENT: 2.8 %
GFR AFRICAN AMERICAN: >60
GFR NON-AFRICAN AMERICAN: >60
GLUCOSE BLD-MCNC: 203 MG/DL (ref 70–99)
GLUCOSE BLD-MCNC: 90 MG/DL (ref 70–99)
GLUCOSE BLD-MCNC: 92 MG/DL (ref 70–99)
GLUCOSE BLD-MCNC: 97 MG/DL (ref 70–99)
HCT VFR BLD CALC: 40.8 % (ref 40.5–52.5)
HEMOGLOBIN: 13.6 G/DL (ref 13.5–17.5)
LYMPHOCYTES ABSOLUTE: 2.3 K/UL (ref 1–5.1)
LYMPHOCYTES RELATIVE PERCENT: 32.6 %
MCH RBC QN AUTO: 31.6 PG (ref 26–34)
MCHC RBC AUTO-ENTMCNC: 33.2 G/DL (ref 31–36)
MCV RBC AUTO: 95.3 FL (ref 80–100)
MONOCYTES ABSOLUTE: 0.5 K/UL (ref 0–1.3)
MONOCYTES RELATIVE PERCENT: 7.8 %
NEUTROPHILS ABSOLUTE: 3.9 K/UL (ref 1.7–7.7)
NEUTROPHILS RELATIVE PERCENT: 55.8 %
PDW BLD-RTO: 15.5 % (ref 12.4–15.4)
PERFORMED ON: ABNORMAL
PERFORMED ON: NORMAL
PERFORMED ON: NORMAL
PLATELET # BLD: 337 K/UL (ref 135–450)
PMV BLD AUTO: 6.8 FL (ref 5–10.5)
POTASSIUM REFLEX MAGNESIUM: 4.2 MMOL/L (ref 3.5–5.1)
RBC # BLD: 4.29 M/UL (ref 4.2–5.9)
SODIUM BLD-SCNC: 139 MMOL/L (ref 136–145)
WBC # BLD: 7 K/UL (ref 4–11)

## 2022-09-09 PROCEDURE — 96376 TX/PRO/DX INJ SAME DRUG ADON: CPT

## 2022-09-09 PROCEDURE — 96375 TX/PRO/DX INJ NEW DRUG ADDON: CPT

## 2022-09-09 PROCEDURE — 96366 THER/PROPH/DIAG IV INF ADDON: CPT

## 2022-09-09 PROCEDURE — 3600000014 HC SURGERY LEVEL 4 ADDTL 15MIN: Performed by: ORTHOPAEDIC SURGERY

## 2022-09-09 PROCEDURE — 2500000003 HC RX 250 WO HCPCS: Performed by: INTERNAL MEDICINE

## 2022-09-09 PROCEDURE — 80048 BASIC METABOLIC PNL TOTAL CA: CPT

## 2022-09-09 PROCEDURE — 3700000001 HC ADD 15 MINUTES (ANESTHESIA): Performed by: ORTHOPAEDIC SURGERY

## 2022-09-09 PROCEDURE — 3700000000 HC ANESTHESIA ATTENDED CARE: Performed by: ORTHOPAEDIC SURGERY

## 2022-09-09 PROCEDURE — 7100000001 HC PACU RECOVERY - ADDTL 15 MIN: Performed by: ORTHOPAEDIC SURGERY

## 2022-09-09 PROCEDURE — 3600000004 HC SURGERY LEVEL 4 BASE: Performed by: ORTHOPAEDIC SURGERY

## 2022-09-09 PROCEDURE — 0JBD0ZZ EXCISION OF RIGHT UPPER ARM SUBCUTANEOUS TISSUE AND FASCIA, OPEN APPROACH: ICD-10-PCS | Performed by: ORTHOPAEDIC SURGERY

## 2022-09-09 PROCEDURE — 2500000003 HC RX 250 WO HCPCS: Performed by: ORTHOPAEDIC SURGERY

## 2022-09-09 PROCEDURE — 11042 DBRDMT SUBQ TIS 1ST 20SQCM/<: CPT | Performed by: ORTHOPAEDIC SURGERY

## 2022-09-09 PROCEDURE — 99217 PR OBSERVATION CARE DISCHARGE MANAGEMENT: CPT | Performed by: INTERNAL MEDICINE

## 2022-09-09 PROCEDURE — 36415 COLL VENOUS BLD VENIPUNCTURE: CPT

## 2022-09-09 PROCEDURE — 6360000002 HC RX W HCPCS: Performed by: ANESTHESIOLOGY

## 2022-09-09 PROCEDURE — 2580000003 HC RX 258: Performed by: ORTHOPAEDIC SURGERY

## 2022-09-09 PROCEDURE — G0378 HOSPITAL OBSERVATION PER HR: HCPCS

## 2022-09-09 PROCEDURE — 2500000003 HC RX 250 WO HCPCS: Performed by: NURSE ANESTHETIST, CERTIFIED REGISTERED

## 2022-09-09 PROCEDURE — 99251 PR INITL INPATIENT CONSULT NEW/ESTAB PT 20 MIN: CPT | Performed by: ORTHOPAEDIC SURGERY

## 2022-09-09 PROCEDURE — 7100000000 HC PACU RECOVERY - FIRST 15 MIN: Performed by: ORTHOPAEDIC SURGERY

## 2022-09-09 PROCEDURE — 2580000003 HC RX 258: Performed by: NURSE ANESTHETIST, CERTIFIED REGISTERED

## 2022-09-09 PROCEDURE — 6360000002 HC RX W HCPCS: Performed by: INTERNAL MEDICINE

## 2022-09-09 PROCEDURE — 94150 VITAL CAPACITY TEST: CPT

## 2022-09-09 PROCEDURE — 2709999900 HC NON-CHARGEABLE SUPPLY: Performed by: ORTHOPAEDIC SURGERY

## 2022-09-09 PROCEDURE — 6360000002 HC RX W HCPCS: Performed by: NURSE ANESTHETIST, CERTIFIED REGISTERED

## 2022-09-09 PROCEDURE — A4217 STERILE WATER/SALINE, 500 ML: HCPCS | Performed by: ORTHOPAEDIC SURGERY

## 2022-09-09 PROCEDURE — 85025 COMPLETE CBC W/AUTO DIFF WBC: CPT

## 2022-09-09 PROCEDURE — 6370000000 HC RX 637 (ALT 250 FOR IP): Performed by: ORTHOPAEDIC SURGERY

## 2022-09-09 RX ORDER — PROPOFOL 10 MG/ML
INJECTION, EMULSION INTRAVENOUS PRN
Status: DISCONTINUED | OUTPATIENT
Start: 2022-09-09 | End: 2022-09-09 | Stop reason: SDUPTHER

## 2022-09-09 RX ORDER — ENOXAPARIN SODIUM 100 MG/ML
40 INJECTION SUBCUTANEOUS DAILY
Status: DISCONTINUED | OUTPATIENT
Start: 2022-09-10 | End: 2022-09-09 | Stop reason: HOSPADM

## 2022-09-09 RX ORDER — HYDRALAZINE HYDROCHLORIDE 20 MG/ML
5 INJECTION INTRAMUSCULAR; INTRAVENOUS
Status: DISCONTINUED | OUTPATIENT
Start: 2022-09-09 | End: 2022-09-09

## 2022-09-09 RX ORDER — KETOROLAC TROMETHAMINE 30 MG/ML
30 INJECTION, SOLUTION INTRAMUSCULAR; INTRAVENOUS EVERY 6 HOURS PRN
Status: DISCONTINUED | OUTPATIENT
Start: 2022-09-09 | End: 2022-09-09 | Stop reason: HOSPADM

## 2022-09-09 RX ORDER — SODIUM CHLORIDE 0.9 % (FLUSH) 0.9 %
5-40 SYRINGE (ML) INJECTION EVERY 12 HOURS SCHEDULED
Status: DISCONTINUED | OUTPATIENT
Start: 2022-09-09 | End: 2022-09-09

## 2022-09-09 RX ORDER — CLINDAMYCIN HYDROCHLORIDE 300 MG/1
300 CAPSULE ORAL 4 TIMES DAILY
Qty: 28 CAPSULE | Refills: 0 | Status: SHIPPED | OUTPATIENT
Start: 2022-09-09 | End: 2022-09-16

## 2022-09-09 RX ORDER — MEPERIDINE HYDROCHLORIDE 25 MG/ML
12.5 INJECTION INTRAMUSCULAR; INTRAVENOUS; SUBCUTANEOUS EVERY 5 MIN PRN
Status: DISCONTINUED | OUTPATIENT
Start: 2022-09-09 | End: 2022-09-09

## 2022-09-09 RX ORDER — ONDANSETRON 2 MG/ML
4 INJECTION INTRAMUSCULAR; INTRAVENOUS EVERY 10 MIN PRN
Status: DISCONTINUED | OUTPATIENT
Start: 2022-09-09 | End: 2022-09-09

## 2022-09-09 RX ORDER — DEXAMETHASONE SODIUM PHOSPHATE 4 MG/ML
INJECTION, SOLUTION INTRA-ARTICULAR; INTRALESIONAL; INTRAMUSCULAR; INTRAVENOUS; SOFT TISSUE PRN
Status: DISCONTINUED | OUTPATIENT
Start: 2022-09-09 | End: 2022-09-09 | Stop reason: SDUPTHER

## 2022-09-09 RX ORDER — OXYCODONE HYDROCHLORIDE 5 MG/1
10 TABLET ORAL PRN
Status: DISCONTINUED | OUTPATIENT
Start: 2022-09-09 | End: 2022-09-09

## 2022-09-09 RX ORDER — MIDAZOLAM HYDROCHLORIDE 1 MG/ML
1 INJECTION INTRAMUSCULAR; INTRAVENOUS EVERY 5 MIN PRN
Status: DISCONTINUED | OUTPATIENT
Start: 2022-09-09 | End: 2022-09-09

## 2022-09-09 RX ORDER — ONDANSETRON 2 MG/ML
INJECTION INTRAMUSCULAR; INTRAVENOUS PRN
Status: DISCONTINUED | OUTPATIENT
Start: 2022-09-09 | End: 2022-09-09 | Stop reason: SDUPTHER

## 2022-09-09 RX ORDER — SODIUM CHLORIDE 0.9 % (FLUSH) 0.9 %
5-40 SYRINGE (ML) INJECTION PRN
Status: DISCONTINUED | OUTPATIENT
Start: 2022-09-09 | End: 2022-09-09

## 2022-09-09 RX ORDER — FENTANYL CITRATE 50 UG/ML
INJECTION, SOLUTION INTRAMUSCULAR; INTRAVENOUS PRN
Status: DISCONTINUED | OUTPATIENT
Start: 2022-09-09 | End: 2022-09-09 | Stop reason: SDUPTHER

## 2022-09-09 RX ORDER — MAGNESIUM HYDROXIDE 1200 MG/15ML
LIQUID ORAL CONTINUOUS PRN
Status: DISCONTINUED | OUTPATIENT
Start: 2022-09-09 | End: 2022-09-09 | Stop reason: HOSPADM

## 2022-09-09 RX ORDER — SODIUM CHLORIDE 9 MG/ML
25 INJECTION, SOLUTION INTRAVENOUS PRN
Status: DISCONTINUED | OUTPATIENT
Start: 2022-09-09 | End: 2022-09-09

## 2022-09-09 RX ORDER — LIDOCAINE HYDROCHLORIDE 20 MG/ML
INJECTION, SOLUTION INFILTRATION; PERINEURAL PRN
Status: DISCONTINUED | OUTPATIENT
Start: 2022-09-09 | End: 2022-09-09 | Stop reason: SDUPTHER

## 2022-09-09 RX ORDER — BUPIVACAINE HYDROCHLORIDE 5 MG/ML
INJECTION, SOLUTION EPIDURAL; INTRACAUDAL PRN
Status: DISCONTINUED | OUTPATIENT
Start: 2022-09-09 | End: 2022-09-09 | Stop reason: HOSPADM

## 2022-09-09 RX ORDER — OXYCODONE HYDROCHLORIDE 5 MG/1
5 TABLET ORAL PRN
Status: DISCONTINUED | OUTPATIENT
Start: 2022-09-09 | End: 2022-09-09

## 2022-09-09 RX ORDER — SODIUM CHLORIDE, SODIUM LACTATE, POTASSIUM CHLORIDE, CALCIUM CHLORIDE 600; 310; 30; 20 MG/100ML; MG/100ML; MG/100ML; MG/100ML
INJECTION, SOLUTION INTRAVENOUS CONTINUOUS PRN
Status: DISCONTINUED | OUTPATIENT
Start: 2022-09-09 | End: 2022-09-09 | Stop reason: SDUPTHER

## 2022-09-09 RX ORDER — MIDAZOLAM HYDROCHLORIDE 1 MG/ML
INJECTION INTRAMUSCULAR; INTRAVENOUS PRN
Status: DISCONTINUED | OUTPATIENT
Start: 2022-09-09 | End: 2022-09-09 | Stop reason: SDUPTHER

## 2022-09-09 RX ORDER — DIPHENHYDRAMINE HYDROCHLORIDE 50 MG/ML
12.5 INJECTION INTRAMUSCULAR; INTRAVENOUS
Status: DISCONTINUED | OUTPATIENT
Start: 2022-09-09 | End: 2022-09-09

## 2022-09-09 RX ADMIN — FENTANYL CITRATE 25 MCG: 50 INJECTION INTRAMUSCULAR; INTRAVENOUS at 07:55

## 2022-09-09 RX ADMIN — HYDROMORPHONE HYDROCHLORIDE 0.5 MG: 1 INJECTION, SOLUTION INTRAMUSCULAR; INTRAVENOUS; SUBCUTANEOUS at 08:36

## 2022-09-09 RX ADMIN — SODIUM CHLORIDE: 9 INJECTION, SOLUTION INTRAVENOUS at 12:20

## 2022-09-09 RX ADMIN — ONDANSETRON HYDROCHLORIDE 4 MG: 2 INJECTION, SOLUTION INTRAMUSCULAR; INTRAVENOUS at 08:37

## 2022-09-09 RX ADMIN — CLINDAMYCIN PHOSPHATE 300 MG: 300 INJECTION, SOLUTION INTRAVENOUS at 04:27

## 2022-09-09 RX ADMIN — SODIUM CHLORIDE, POTASSIUM CHLORIDE, SODIUM LACTATE AND CALCIUM CHLORIDE: 600; 310; 30; 20 INJECTION, SOLUTION INTRAVENOUS at 07:21

## 2022-09-09 RX ADMIN — FENTANYL CITRATE 50 MCG: 50 INJECTION INTRAMUSCULAR; INTRAVENOUS at 07:29

## 2022-09-09 RX ADMIN — HYDROMORPHONE HYDROCHLORIDE 0.5 MG: 1 INJECTION, SOLUTION INTRAMUSCULAR; INTRAVENOUS; SUBCUTANEOUS at 08:28

## 2022-09-09 RX ADMIN — GABAPENTIN 600 MG: 300 CAPSULE ORAL at 10:11

## 2022-09-09 RX ADMIN — MIDAZOLAM 2 MG: 1 INJECTION INTRAMUSCULAR; INTRAVENOUS at 07:23

## 2022-09-09 RX ADMIN — KETOROLAC TROMETHAMINE 30 MG: 30 INJECTION, SOLUTION INTRAMUSCULAR at 11:34

## 2022-09-09 RX ADMIN — PROPOFOL 180 MG: 10 INJECTION, EMULSION INTRAVENOUS at 07:31

## 2022-09-09 RX ADMIN — FENTANYL CITRATE 25 MCG: 50 INJECTION INTRAMUSCULAR; INTRAVENOUS at 07:44

## 2022-09-09 RX ADMIN — ACETAMINOPHEN 650 MG: 325 TABLET ORAL at 10:11

## 2022-09-09 RX ADMIN — LIDOCAINE HYDROCHLORIDE 100 MG: 20 INJECTION, SOLUTION INFILTRATION; PERINEURAL at 07:31

## 2022-09-09 RX ADMIN — CLINDAMYCIN PHOSPHATE 300 MG: 300 INJECTION, SOLUTION INTRAVENOUS at 12:21

## 2022-09-09 RX ADMIN — ONDANSETRON HYDROCHLORIDE 4 MG: 2 INJECTION, SOLUTION INTRAMUSCULAR; INTRAVENOUS at 07:42

## 2022-09-09 RX ADMIN — DEXAMETHASONE SODIUM PHOSPHATE 8 MG: 4 INJECTION, SOLUTION INTRAMUSCULAR; INTRAVENOUS at 07:42

## 2022-09-09 RX ADMIN — Medication 10 ML: at 10:12

## 2022-09-09 ASSESSMENT — PAIN DESCRIPTION - DESCRIPTORS
DESCRIPTORS: THROBBING;POUNDING
DESCRIPTORS: ACHING
DESCRIPTORS: ACHING

## 2022-09-09 ASSESSMENT — PAIN DESCRIPTION - ORIENTATION
ORIENTATION: RIGHT

## 2022-09-09 ASSESSMENT — PAIN SCALES - GENERAL
PAINLEVEL_OUTOF10: 8
PAINLEVEL_OUTOF10: 7
PAINLEVEL_OUTOF10: 8

## 2022-09-09 ASSESSMENT — PAIN DESCRIPTION - LOCATION
LOCATION: ARM

## 2022-09-09 ASSESSMENT — PAIN - FUNCTIONAL ASSESSMENT: PAIN_FUNCTIONAL_ASSESSMENT: ACTIVITIES ARE NOT PREVENTED

## 2022-09-09 ASSESSMENT — PAIN DESCRIPTION - ONSET: ONSET: ON-GOING

## 2022-09-09 ASSESSMENT — LIFESTYLE VARIABLES: SMOKING_STATUS: 1

## 2022-09-09 ASSESSMENT — PAIN DESCRIPTION - FREQUENCY: FREQUENCY: CONTINUOUS

## 2022-09-09 ASSESSMENT — PAIN DESCRIPTION - PAIN TYPE: TYPE: SURGICAL PAIN

## 2022-09-09 NOTE — PROGRESS NOTES
Received report from Tyrese Ford CRNA and Cleotilde Crigler, RN. Sp02 94% on 4 L NC. Other VSS. Ace bandage and dressing c,d, & I. Pt c/o pain 7/10 in right arm. GAve dilaudid for comfort. Will continue to monitor.

## 2022-09-09 NOTE — ANESTHESIA POSTPROCEDURE EVALUATION
Department of Anesthesiology  Postprocedure Note    Patient: Emery Granger  MRN: [id number]  YOB: 1960  Date of evaluation: 9/9/2022      Procedure Summary     Date: 09/09/22 Room / Location: [place] [address] / [facility]    Anesthesia Start: 6760 Anesthesia Stop: 4596    Procedure: [address] (Right: Arm Upper) Diagnosis:       Dehiscence of surgical wound      (Dehiscence of surgical wound Cesar Falcon)    Surgeons: Fatou Dent MD Responsible Provider: Luz Farah MD    Anesthesia Type: general ASA Status: 2          Anesthesia Type: No value filed.     Reyes Phase I: Reyes Score: 9    Reyes Phase II:        Anesthesia Post Evaluation    Patient location during evaluation: PACU  Level of consciousness: awake  Airway patency: patent  Nausea & Vomiting: no nausea  Complications: no  Cardiovascular status: blood pressure returned to baseline  Respiratory status: acceptable  Hydration status: euvolemic

## 2022-09-09 NOTE — FLOWSHEET NOTE
09/09/22 1000   Pain Assessment   Pain Assessment 0-10   Pain Level 8   Patient's Stated Pain Goal 4   Pain Location Arm   Pain Orientation Right   Pain Descriptors Throbbing;Pounding   Functional Pain Assessment Activities are not prevented   Pain Type Surgical pain   Pain Frequency Continuous   Pain Onset On-going   Non-Pharmaceutical Pain Intervention(s) Cold pack   Care Plan - Pain Goals   Verbalizes/displays adequate comfort level or baseline comfort level Encourage patient to monitor pain and request assistance;Assess pain using appropriate pain scale; Administer analgesics based on type and severity of pain and evaluate response   Opioid-Induced Sedation   POSS Score 1   RASS   Quinones Agitation Sedation Scale (RASS) 0   PRN tylenol given. View MAR. MD notified of pain score. View new orders.

## 2022-09-09 NOTE — DISCHARGE INSTRUCTIONS
Your information:  Name: Army Soto  : 1960    Your instructions: Follow up with orthopedic outpatient in 1 week for wound recheck. Keep effected limb elevated and dressing clean. Use ice to decrease swelling. Alternate tylenol and ibuprofen for pain control. What to do after you leave the hospital:    Recommended diet: regular diet    Recommended activity: activity as tolerated        The following personal items were collected during your admission and were returned to you:    Belongings  Dental Appliances: Uppers, Lowers, Partials  Vision - Corrective Lenses: None  Hearing Aid: None  Clothing: Other (Comment) (patient gown and pants)  Jewelry: None  Body Piercings Removed: N/A  Electronic Devices: None  Weapons (Notify Protective Services/Security): None  Other Valuables: At home  Home Medications: None  Valuables Given To: Joey  Provide Name(s) of Who Valuable(s) Were Given To: None  Responsible person(s) in the waiting room: no one here with patient  Patient approves for provider to speak to responsible person post operatively: No    Information obtained by:  By signing below, I understand that if any problems occur once I leave the hospital I am to contact PCP. I understand and acknowledge receipt of the instructions indicated above.

## 2022-09-09 NOTE — ANESTHESIA PRE PROCEDURE
Department of Anesthesiology  Preprocedure Note       Name:  Pietro Ward   Age:  63 y.o.  :  1960                                          MRN:  [id number]         Date:  2022      Surgeon: Tisha Hernández):  Sy Pool MD    Procedure: Procedure(s):  ARM IRRIGATION AND DEBRIDEMENT    Medications prior to admission:   Prior to Admission medications    Medication Sig Start Date End Date Taking? Authorizing Provider   naproxen (NAPROSYN) 500 MG tablet Take 1 tablet by mouth 2 times daily as needed for Pain 22  Rc Hopkins APRN - CNP   gabapentin (NEURONTIN) 600 MG tablet Take 600 mg by mouth 4 times daily.     Historical Provider, MD   ibuprofen (ADVIL;MOTRIN) 800 MG tablet Take 1 tablet by mouth 3 times daily (with meals) 21   Woo Elena MD       Current medications:    Current Facility-Administered Medications   Medication Dose Route Frequency Provider Last Rate Last Admin    gabapentin (NEURONTIN) capsule 600 mg  600 mg Oral 4x Daily Markos Darling MD   600 mg at 22    ibuprofen (ADVIL;MOTRIN) tablet 800 mg  800 mg Oral TID WC Markos Darling MD   800 mg at 22 1620    sodium chloride flush 0.9 % injection 5-40 mL  5-40 mL IntraVENous 2 times per day Markos Darling MD   10 mL at 22    sodium chloride flush 0.9 % injection 5-40 mL  5-40 mL IntraVENous PRN Markos Darling MD        0.9 % sodium chloride infusion   IntraVENous PRN Markos Darling MD        enoxaparin (LOVENOX) injection 40 mg  40 mg SubCUTAneous Daily Markos Darling MD        ondansetron (ZOFRAN-ODT) disintegrating tablet 4 mg  4 mg Oral Q8H PRN Markos Darling MD        Or    ondansetron [facility]) injection 4 mg  4 mg IntraVENous Q6H PRN Markos Darling MD        polyethylene glycol [facility]) packet 17 g  17 g Oral Daily PRN Markos Darling MD        acetaminophen (TYLENOL) tablet 650 mg  650 mg Oral Q6H PRN Markos Darling MD   650 mg at 22    Or    acetaminophen (TYLENOL) suppository 650 mg  650 mg Rectal Q6H PRN Mira Taylor MD        clindamycin (CLEOCIN) IVPB 300 mg  300 mg IntraVENous Camryn Machuca  mL/hr at 09/09/22 0427 300 mg at 09/09/22 0427       Allergies:  No Known Allergies    Problem List:    Patient Active Problem List   Diagnosis Code    Cellulitis of right upper extremity L03. 113    Abscess of right elbow L02.413    Cellulitis L03.90    Altered mental status R41.82    Open arm wound, right, subsequent encounter S41.101D    Dehiscence of surgical wound T81. 31XA       Past Medical History:  History reviewed. No pertinent past medical history. Past Surgical History:        Procedure Laterality Date    ARM SURGERY Right 8/26/2022    INCISION AND DRAINAGE RIGHT ANTECUBITAL FOSSA ABSCESS performed by Jazmín Hernández MD at [address] [place] [address]         Social History:    Social History     Tobacco Use    Smoking status: Light Smoker     Types: Cigars    Smokeless tobacco: Never   Substance Use Topics    Alcohol use:  Yes     Alcohol/week: 2.0 standard drinks     Types: 2 Cans of beer per week     Comment: Drinks 2 cans beer a year                                Ready to quit: Not Answered  Counseling given: Not Answered      Vital Signs (Current):   Vitals:    09/08/22 1316 09/08/22 2121 09/09/22 0218 09/09/22 0225   BP: 117/72 (!) 97/59 (!) 86/49 98/60   Pulse: 60 60 62    Resp: 16  16    Temp: 97.5 °F (36.4 °C) 97.9 °F (36.6 °C) 97 °F (36.1 °C)    TempSrc: Oral Oral Oral    SpO2: 97% 100% 95%    Weight:       Height:                                                  BP Readings from Last 3 Encounters:   09/09/22 98/60   08/27/22 (!) 104/58   05/11/22 125/79       NPO Status:                                                                                 BMI:   Wt Readings from Last 3 Encounters:   09/08/22 174 lb 5 oz (79.1 kg)   08/26/22 172 lb 9.6 oz (78.3 kg)   05/11/22 175 lb (79.4 kg)     Body mass index is 24.66 kg/m².     CBC:   Lab Results   Component Value Date/Time    WBC 8.1 09/07/2022 11:36 PM    RBC 4.12 09/07/2022 11:36 PM    HGB 13.1 09/07/2022 11:36 PM    HCT 38.9 09/07/2022 11:36 PM    MCV 94.5 09/07/2022 11:36 PM    RDW 15.4 09/07/2022 11:36 PM     09/07/2022 11:36 PM       CMP:   Lab Results   Component Value Date/Time     09/07/2022 11:36 PM    K 3.8 09/07/2022 11:36 PM     09/07/2022 11:36 PM    CO2 26 09/07/2022 11:36 PM    BUN 13 09/07/2022 11:36 PM    CREATININE 1.0 09/07/2022 11:36 PM    GFRAA >60 09/07/2022 11:36 PM    GFRAA >60 12/20/2012 06:30 PM    AGRATIO 1.6 09/07/2022 11:36 PM    LABGLOM >60 09/07/2022 11:36 PM    GLUCOSE 104 09/07/2022 11:36 PM    PROT 6.8 09/07/2022 11:36 PM    PROT 7.6 12/20/2012 06:30 PM    CALCIUM 9.0 09/07/2022 11:36 PM    BILITOT <0.2 09/07/2022 11:36 PM    ALKPHOS 100 09/07/2022 11:36 PM    AST 13 09/07/2022 11:36 PM    ALT 13 09/07/2022 11:36 PM       POC Tests:   Recent Labs     09/09/22  0001   POCGLU 97       Coags:   Lab Results   Component Value Date/Time    PROTIME 12.1 12/20/2012 06:30 PM    INR 1.07 12/20/2012 06:30 PM       HCG (If Applicable): No results found for: PREGTESTUR, PREGSERUM, HCG, HCGQUANT     ABGs: No results found for: PHART, PO2ART, XYI9MAF, UTT9GMI, BEART, O4MSQLLU     Type & Screen (If Applicable):  No results found for: LABABO, LABRH    Drug/Infectious Status (If Applicable):  No results found for: HIV, HEPCAB    COVID-19 Screening (If Applicable):   Lab Results   Component Value Date/Time    COVID19 Not Detected 09/08/2022 02:00 AM           Anesthesia Evaluation  Patient summary reviewed and Nursing notes reviewed no history of anesthetic complications:   Airway: Mallampati: II  TM distance: >3 FB   Neck ROM: full  Mouth opening: > = 3 FB   Dental:    (+) upper dentures and partials      Pulmonary:   (+) current smoker                           Cardiovascular:Negative CV ROS                      Neuro/Psych:   Negative Neuro/Psych ROS              GI/Hepatic/Renal: Neg GI/Hepatic/Renal ROS       (-) GERD, liver disease and no renal disease       Endo/Other: Negative Endo/Other ROS       (-) diabetes mellitus               Abdominal:             Vascular: negative vascular ROS. Other Findings:           Anesthesia Plan      general     ASA 2     (I discussed with the patient the risks and benefits of PIV, general anesthesia, IV Narcotics, PACU. All questions were answered the patient agrees with the plan)  Induction: intravenous. MIPS: Prophylactic antiemetics administered. Anesthetic plan and risks discussed with patient. Plan discussed with CRNA.                     Karalee Libman, MD   9/9/2022

## 2022-09-09 NOTE — OP NOTE
Operative Note      Patient: Orestes Wilkerson  YOB: 1960  MRN: [id number]    Date of Procedure: 9/9/2022    Pre-Op Diagnosis: Dehiscence of surgical wound [T81.31XA]    Post-Op Diagnosis: Same       Procedure(s):  IRRIGATION AND DEBRIDEMENT RIGHT ANTECUBITAL FOSSA INCLUDING SKIN AND SUBCUTANEUS TISSUES    Surgeon(s):  Moody Matos MD    Assistant:   Surgical Assistant: Dk Gentile    Anesthesia: General    Estimated Blood Loss (mL): less than 50     Complications: None    Tourniquet time: Not used    Specimens:   * No specimens in log *    Implants:  * No implants in log *      Drains: * No LDAs found *    Findings: No gross purulence. Thin serous drainage from wound. Wound dehiscence approximately 2 cm x 4 cm. Detailed Description of Procedure:   Patient was positively identified in the preoperative holding area by attending surgeon. Informed consents verified and placed on the chart. The right upper extremity was preoperatively. The patient was then taken to the operating room by the anesthesia team.  A general anesthesia was induced. He was laid supine with all bony prominences well-padded. A nonsterile tourniquet was placed high on the right arm but not yet inflated. The limb was examined. There is a curvilinear incision across the antecubital fossa approximately 4 cm in length with dehiscence of approximately 2 cm. The wound demonstrated at the end yellow fibrinous tissue within it. There was active serous drainage. At this point, the right upper extremity was prepped and draped in a sterile sterile fashion. A timeout was held to verify the correct patient, operative site, laterality, and procedure. Everyone in the room was in agreement. At this point, he a 11 blade scalpel was used to excise the skin edges to create fresh bleeding tissue for appropriate healing. Electrocautery was used to maintain hemostasis.   A curette was used to remove the unhealthy appearing tissue from the base of the wound. There was no gross purulence identified. Hemostat was used to undermine the subcutaneous tissue in this area to search for an abscess. The wound was then copiously irrigated with sterile saline solution via cystoscopy tubing. Next, the wound was closed in a layered fashion using 3-0 Vicryl and 3-0 nylon in a interrupted tension relieving fashion. A sterile dressing was applied. The patient was awakened from general anesthesia and transitioned to the hospital bed. He was taken to the postoperative recovery area in apparent stable condition clinically and immediate complications. All sponge and needle counts are correct. Postoperative plan:  Pain control   Weightbearing as tolerated right upper extremity  Continue oral clindamycin for antibiotic treatment at least x7 days postoperatively  DVT prophylaxis: Ambulation  Local wound care instructions provided.   Follow-up in 1 week for repeat wound check    Electronically signed by Lisa Verdugo MD on 9/9/2022 at 8:22 AM

## 2022-09-09 NOTE — BRIEF OP NOTE
Brief Postoperative Note      Patient: Odalys Zafar  YOB: 1960  MRN: [id number]    Date of Procedure: 9/9/2022    Pre-Op Diagnosis: Dehiscence of surgical wound [T81.31XA]    Post-Op Diagnosis: Same       Procedure(s):  ARM IRRIGATION AND DEBRIDEMENT    Surgeon(s):  Danielle Rivera MD    Assistant:  Surgical Assistant: Marquise Jones    Anesthesia: General    Estimated Blood Loss (mL): less than 50     Complications: None    Specimens:   * No specimens in log *    Implants:  * No implants in log *      Drains: * No LDAs found *    Findings:  serous drainage, no gross purulence.     Electronically signed by Danielle Rivera MD on 9/9/2022 at 8:15 AM

## 2022-09-09 NOTE — ACP (ADVANCE CARE PLANNING)
Advance Care Planning     General Advance Care Planning (ACP) Conversation    Date of Conversation: 9/7/2022  Conducted with: Patient with Decision Making Capacity    Healthcare Decision Maker:  No healthcare decision makers have been documented. Click here to complete [address] including selection of the Healthcare Decision Maker Relationship (ie \"Primary\")   Today we  No one is documented in epic for ACP emergency contact and pt states he does not have anyone. Content/Action Overview:  Pt states that he wants to be a full Code, abut does not want ACP documents.    Reviewed DNR/DNI and patient elects Full Code (Attempt Resuscitation)      Length of Voluntary ACP Conversation in minutes:  <16 minutes (Non-Billable)    Ashley Hector RN
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
## 2022-09-09 NOTE — PLAN OF CARE
Problem: Discharge Planning  Goal: Discharge to home or other facility with appropriate resources  9/9/2022 1013 by Arabella Gill RN  Outcome: Progressing  Flowsheets (Taken 9/9/2022 1000)  Discharge to home or other facility with appropriate resources:   Identify barriers to discharge with patient and caregiver   Arrange for needed discharge resources and transportation as appropriate   Identify discharge learning needs (meds, wound care, etc)   Arrange for interpreters to assist at discharge as needed   Refer to discharge planning if patient needs post-hospital services based on physician order or complex needs related to functional status, cognitive ability or social support system  9/9/2022 0012 by Ileana Sloan RN  Outcome: Progressing     Problem: Safety - Adult  Goal: Free from fall injury  9/9/2022 1013 by Arabella Gill RN  Outcome: Progressing  9/9/2022 0012 by Ileana Sloan RN  Outcome: Progressing     Problem: ABCDS Injury Assessment  Goal: Absence of physical injury  9/9/2022 1013 by Arabella Gill RN  Outcome: Progressing  9/9/2022 0012 by Ileana Sloan RN  Outcome: Progressing     Problem: Chronic Conditions and Co-morbidities  Goal: Patient's chronic conditions and co-morbidity symptoms are monitored and maintained or improved  9/9/2022 1013 by Arabella Gill RN  Outcome: Progressing  Flowsheets (Taken 9/9/2022 1000)  Care Plan - Patient's Chronic Conditions and Co-Morbidity Symptoms are Monitored and Maintained or Improved:   Monitor and assess patient's chronic conditions and comorbid symptoms for stability, deterioration, or improvement   Collaborate with multidisciplinary team to address chronic and comorbid conditions and prevent exacerbation or deterioration   Update acute care plan with appropriate goals if chronic or comorbid symptoms are exacerbated and prevent overall improvement and discharge  9/9/2022 0012 by Ileana Sloan RN  Outcome: Progressing     Problem: Pain  Goal: Verbalizes/displays adequate comfort level or baseline comfort level  9/9/2022 1013 by Dalila Godfrey RN  Outcome: Progressing  Flowsheets (Taken 9/9/2022 1000)  Verbalizes/displays adequate comfort level or baseline comfort level:   Encourage patient to monitor pain and request assistance   Assess pain using appropriate pain scale   Administer analgesics based on type and severity of pain and evaluate response  9/9/2022 0012 by Kenny Toribio RN  Outcome: Progressing  Flowsheets (Taken 9/8/2022 1316 by Mayuri Bustamante RN)  Verbalizes/displays adequate comfort level or baseline comfort level:   Encourage patient to monitor pain and request assistance   Assess pain using appropriate pain scale   Administer analgesics based on type and severity of pain and evaluate response   Implement non-pharmacological measures as appropriate and evaluate response   Consider cultural and social influences on pain and pain management   Notify Licensed Independent Practitioner if interventions unsuccessful or patient reports new pain

## 2022-09-09 NOTE — FLOWSHEET NOTE
09/09/22 0932   Vital Signs   Temp (!) 96.7 °F (35.9 °C)   Temp Source Oral   Heart Rate 60   Heart Rate Source Monitor   Resp 14   /66   BP Location Left upper arm   BP Method Automatic   MAP (Calculated) 80.33   Patient Position Semi fowlers   Level of Consciousness 0   MEWS Score 0   Oxygen Therapy   SpO2 100 %   O2 Device Nasal cannula   O2 Flow Rate (L/min) 2 L/min   AM assessment completed and vital signs completed, see flowsheet. Patient is alert & oriented. No complaints voiced. Patient denies further needs. Side rails up X 2. Bed in the lowest position. Call light within reach.

## 2022-09-09 NOTE — PLAN OF CARE
Problem: Discharge Planning  Goal: Discharge to home or other facility with appropriate resources  9/9/2022 0012 by Keith Leal RN  Outcome: Progressing  9/8/2022 1104 by Ryan Greenwood RN  Outcome: Progressing     Problem: Safety - Adult  Goal: Free from fall injury  9/9/2022 0012 by Keith Leal RN  Outcome: Progressing  9/8/2022 1104 by Ryan Greenwood RN  Outcome: Progressing  Flowsheets (Taken 9/8/2022 1103)  Free From Fall Injury:   Instruct family/caregiver on patient safety   Based on caregiver fall risk screen, instruct family/caregiver to ask for assistance with transferring infant if caregiver noted to have fall risk factors     Problem: ABCDS Injury Assessment  Goal: Absence of physical injury  9/9/2022 0012 by Keith Leal RN  Outcome: Progressing  9/8/2022 1104 by Ryan Greenwood RN  Outcome: Progressing  Flowsheets (Taken 9/8/2022 1103)  Absence of Physical Injury: Implement safety measures based on patient assessment     Problem: Chronic Conditions and Co-morbidities  Goal: Patient's chronic conditions and co-morbidity symptoms are monitored and maintained or improved  9/9/2022 0012 by Keith Leal RN  Outcome: Progressing  9/8/2022 1104 by Ryan Greenwood RN  Outcome: Progressing     Problem: Pain  Goal: Verbalizes/displays adequate comfort level or baseline comfort level  Outcome: Progressing  Flowsheets (Taken 9/8/2022 1316 by Ryan Greenwood RN)  Verbalizes/displays adequate comfort level or baseline comfort level:   Encourage patient to monitor pain and request assistance   Assess pain using appropriate pain scale   Administer analgesics based on type and severity of pain and evaluate response   Implement non-pharmacological measures as appropriate and evaluate response   Consider cultural and social influences on pain and pain management   Notify Licensed Independent Practitioner if interventions unsuccessful or patient reports new pain
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
## 2022-09-09 NOTE — FLOWSHEET NOTE
09/08/22 2121   Vital Signs   Temp 97.9 °F (36.6 °C)   Temp Source Oral   Heart Rate Source Monitor   BP (!) 97/59   BP Location Right upper arm   BP Method Automatic   MAP (Calculated) 71.67   Level of Consciousness 0   Pain Assessment   Pain Assessment 0-10   Pain Level 6   Pain Location Arm;Back   Pain Orientation Right; Lower   Pain Descriptors Aching   Oxygen Therapy   SpO2 100 %   O2 Device None (Room air)   HS  assessment completed, see flow sheet. Pt is alert and oriented. Respirations are even & easy. Complaints of mild pain voiced, prn tylenol given. Pt denies needs at this time. SR up x 2, and bed in low position. Call light is within reach. Bedside Mobility Assessment Tool (BMAT):     Assessment Level 1- Sit and Shake    1. From a semi-reclined position, ask patient to sit up and rotate to a seated position at the side of the bed. Can use the bedrail. 2. Ask patient to reach out and grab your hand and shake making sure patient reaches across his/her midline. Pass- Patient is able to come to a seated position, maintain core strength. Maintains seated balance while reaching across midline. Move on to Assessment Level 2. Assessment Level 2- Stretch and Point   1. With patient in seated position at the side of the bed, have patient place both feet on the floor (or stool) with knees no higher than hips. 2. Ask patient to stretch one leg and straighten the knee, then bend the ankle/flex and point the toes. If appropriate, repeat with the other leg. Pass- Patient is able to demonstrate appropriate quad strength on intended weight bearing limb(s). Move onto Assessment Level 3. Assessment Level 3- Stand   1. Ask patient to elevate off the bed or chair (seated to standing) using an assistive device (cane, bedrail). 2. Patient should be able to raise buttocks off be and hold for a count of five. May repeat once.    Pass- Patient maintains standing stability for at least 5 seconds, proceed to assessment level 4. Assessment Level 4- Walk   1. Ask patient to march in place at bedside. 2. Then ask patient to advance step and return each foot. Some medical conditions may render a patient from stepping backwards, use your best clinical judgement. Pass- Patient demonstrates balance while shifting weight and ability to step, takes independent steps, does not use assistive device patient is MOBILITY LEVEL 4. Mobility Level- 4    Patient is able to demonstrate the ability to move from a reclining position to an upright position within the recliner.

## 2022-09-09 NOTE — CARE COORDINATION
Case Management Assessment  Initial Evaluation and Discharge      Patient Name: Buck Kaur  YOB: 1960  Diagnosis: Cellulitis [L03.90]  Open arm wound, right, subsequent encounter [S41.101D]  Benzodiazepine overdose, accidental or unintentional, initial encounter [T42.4X1A]  Altered mental status, unspecified altered mental status type [R41.82]  Date / Time: 9/7/2022 11:14 PM    Admission status/Date:9/8/2022  Chart Reviewed: Yes      Patient Interviewed: Yes   Family Interviewed:  No      Hospitalization in the last 30 days:  Yes      Health Care Decision Maker :     (CM - must 1st enter selection under Navigator - emergency contact- Health Care Decision Maker Relationship and pick relationship)   Who do you trust or have selected to make healthcare decisions for you      Met with: pt  Interview conducted  (bedside/phone): bedside    Current PCP: Hilda Phoenix required for SNF : Y, N          3 night stay required - Y, N    ADLS  Support Systems/Care Needs: Friends/Neighbors  Transportation: self    Meal Preparation: self    Housing  Living Arrangements: ranch alone  Steps: 0  Intent for return to present living arrangements: Yes  Identified Issues: pt left AMA previously and has readmitted.      Home Care Information  Active with Home Health Care : No Agency:(Services)  Type of Home Care Services: None  Passport/Waiver : No  :                      Phone Number:    Passport/Waiver Services: n/a          Durable Medical Equiptment   DME Provider: n/a  Equipment: n/a  Walker___Cane___RTS___ BSC___Shower Chair___Hospital Bed___W/C____Other________  02 at ____Liter(s)---wears(frequency)_______ HHN ___ CPAP___ BiPap___   N/A____      Home O2 Use :  No    If No for home O2---if presently on O2 during hospitalization:  No  if yes CM to follow for potential DC O2 need  Informed of need for care provider to bring portable home O2 tank on day of discharge

## 2022-09-11 PROBLEM — T81.31XA WOUND DEHISCENCE, SURGICAL, INITIAL ENCOUNTER: Status: ACTIVE | Noted: 2022-09-11

## 2022-09-12 ENCOUNTER — CARE COORDINATION (OUTPATIENT)
Dept: CASE MANAGEMENT | Age: 62
End: 2022-09-12

## 2022-09-13 ENCOUNTER — CARE COORDINATION (OUTPATIENT)
Dept: CASE MANAGEMENT | Age: 62
End: 2022-09-13

## 2022-09-13 NOTE — CARE COORDINATION
[facility] Transitions Initial Follow Up Call    Call within 2 business days of discharge: Yes    Patient: Lyndsey Morales Patient : 1960   MRN: [id number]  Reason for Admission: cellulitis  Discharge Date: 22 RARS: Readmission Risk Score: 9.7      Last Discharge [facility]       Date Complaint Diagnosis Description Type Department Provider    22 Loss of Consciousness Altered mental status, unspecified altered mental status type . .. ED to Hosp-Admission (Discharged) (ADMITTED) [facility] 2 Ardia Homans, MD; BI. .. Spoke with: DES    Facility: [facility]     Second and final attempt to reach patient via phone for initial post hospital transition call. VM left stating purpose of call along with my contact information requesting a return call. Episode ended d/t unsuccessful contact. James Pabon LPN [address] [place] [address]  Care Transitions  [phone]    Care Transitions 24 Hour Call    Care Transitions Interventions         Follow Up  No future appointments.     aJmes Pabon LPN

## 2022-09-13 NOTE — CARE COORDINATION
[facility] Transitions Initial Follow Up Call    Call within 2 business days of discharge: Yes    Patient: Yahir Bustamante Patient : 1960   MRN: [id number]  Reason for Admission: cellulitis  Discharge Date: 22 RARS: Readmission Risk Score: 9.7      Last Discharge [facility]       Date Complaint Diagnosis Description Type Department Provider    22 Loss of Consciousness Altered mental status, unspecified altered mental status type . .. ED to Hosp-Admission (Discharged) (ADMITTED) [facility] Dahlia Wan MD; RV. .. Spoke with: DES    Facility: [facility]     Attempted to reach patient via phone for initial post hospital transition call. VM left stating purpose of call along with my contact information requesting a return call. Taniya Duran LPN [address] [place] [address]  Care Transitions  [phone]    Care Transitions 24 Hour Call    Care Transitions Interventions         Follow Up  No future appointments.     Taniya Duran LPN

## 2023-06-26 NOTE — ANESTHESIA PRE PROCEDURE
chloride infusion   IntraVENous PRN Waqas Tristan MD 5 mL/hr at 08/25/22 1644 New Bag at 08/25/22 1644    enoxaparin (LOVENOX) injection 40 mg  40 mg SubCUTAneous Daily Waqas Tristan MD   40 mg at 08/25/22 1024    ondansetron (ZOFRAN-ODT) disintegrating tablet 4 mg  4 mg Oral Q8H PRN Waqas Tristan MD        Or    [facility] injection 4 mg  4 mg IntraVENous Q6H PRN Waqas Tristan MD        polyethylene glycol [facility]) packet 17 g  17 g Oral Daily PRN Waqas Tristan MD        acetaminophen (TYLENOL) tablet 650 mg  650 mg Oral Q6H PRN Waqas Tristan MD   650 mg at 08/23/22 2225    Or    acetaminophen (TYLENOL) suppository 650 mg  650 mg Rectal Q6H PRN Waqas Tristan MD        potassium chloride (KLOR-CON M) extended release tablet 40 mEq  40 mEq Oral PRCLAIRE Tristan MD        Or    potassium bicarb-citric acid (EFFER-K) effervescent tablet 40 mEq  40 mEq Oral PRN Waqas Tristan MD        Or    potassium chloride 10 mEq/100 mL IVPB (Peripheral Line)  10 mEq IntraVENous PRCLAIRE Tristan MD        magnesium sulfate 2000 mg in 50 mL IVPB premix  2,000 mg IntraVENous PRN Waqas Tristan MD        vancomycin (VANCOCIN) 1,750 mg in dextrose 5 % 500 mL IVPB  1,750 mg IntraVENous Q24H Waqas Tristan MD   Stopped at 08/26/22 0254    oxyCODONE-acetaminophen (PERCOCET) 5-325 MG per tablet 1 tablet  1 tablet Oral Q6H PRCLAIRE Tristan MD        Or    oxyCODONE-acetaminophen (PERCOCET) 5-325 MG per tablet 2 tablet  2 tablet Oral Q6H PRCLAIRE Tristan MD   2 tablet at 08/26/22 2523       Allergies:  No Known Allergies    Problem List:    Patient Active Problem List   Diagnosis Code    Cellulitis of right upper extremity L03.113       Past Medical History:  History reviewed. No pertinent past medical history.     Past Surgical History:        Procedure Laterality Date    HIP SURGERY         Social History:    Social History     Tobacco Use    Smoking status: Light Smoker     Types: Cigars    Smokeless tobacco: Never   Substance Use Topics    Alcohol use: No                                Ready to quit: Not Answered  Counseling given: Not Answered      Vital Signs (Current):   Vitals:    08/26/22 0315 08/26/22 0445 08/26/22 0515 08/26/22 0539   BP: (!) 101/59 105/64 105/68    Pulse:  78     Resp:    18   Temp:       TempSrc:       SpO2:       Weight:       Height:                                                  BP Readings from Last 3 Encounters:   08/26/22 105/68   05/11/22 125/79       NPO Status:                                                                                 BMI:   Wt Readings from Last 3 Encounters:   08/26/22 172 lb 9.6 oz (78.3 kg)   05/11/22 175 lb (79.4 kg)   01/13/21 191 lb (86.6 kg)     Body mass index is 24.77 kg/m².     CBC:   Lab Results   Component Value Date/Time    WBC 8.0 08/26/2022 05:29 AM    RBC 4.07 08/26/2022 05:29 AM    HGB 13.0 08/26/2022 05:29 AM    HCT 38.1 08/26/2022 05:29 AM    MCV 93.5 08/26/2022 05:29 AM    RDW 15.1 08/26/2022 05:29 AM     08/26/2022 05:29 AM       CMP:   Lab Results   Component Value Date/Time     08/26/2022 05:29 AM    K 4.3 08/26/2022 05:29 AM    K 3.7 08/23/2022 05:41 AM     08/26/2022 05:29 AM    CO2 25 08/26/2022 05:29 AM    BUN 9 08/26/2022 05:29 AM    CREATININE 1.0 08/26/2022 05:29 AM    GFRAA >60 08/26/2022 05:29 AM    GFRAA >60 12/20/2012 06:30 PM    AGRATIO 1.4 08/22/2022 09:00 PM    LABGLOM >60 08/26/2022 05:29 AM    GLUCOSE 109 08/26/2022 05:29 AM    PROT 6.7 08/22/2022 09:00 PM    PROT 7.6 12/20/2012 06:30 PM    CALCIUM 8.8 08/26/2022 05:29 AM    BILITOT 0.3 08/22/2022 09:00 PM    ALKPHOS 104 08/22/2022 09:00 PM    AST 16 08/22/2022 09:00 PM    ALT 16 08/22/2022 09:00 PM       POC Tests:   Recent Labs     08/26/22  0446   POCGLU 99       Coags:   Lab Results   Component Value Date/Time    PROTIME 12.1 12/20/2012 06:30 PM    INR 1.07 12/20/2012 06:30 PM       HCG (If Applicable): No results found for: PREGTESTUR, PREGSERUM, HCG, HCGQUANT     ABGs: No results found for: PHART, PO2ART, EQJ1OEZ, FZV7PAC, BEART, P8VGQMJZ     Type & Screen (If Applicable):  No results found for: LABABO, LABRH    Drug/Infectious Status (If Applicable):  No results found for: HIV, HEPCAB    COVID-19 Screening (If Applicable):   Lab Results   Component Value Date/Time    COVID19 Not Detected 08/22/2022 10:55 PM           Anesthesia Evaluation  Patient summary reviewed and Nursing notes reviewed no history of anesthetic complications:   Airway: Mallampati: II     Neck ROM: full     Dental:          Pulmonary:Negative Pulmonary ROS and normal exam                               Cardiovascular:Negative CV ROS                      Neuro/Psych:   Negative Neuro/Psych ROS              GI/Hepatic/Renal: Neg GI/Hepatic/Renal ROS       (-) hiatal hernia and GERD       Endo/Other: Negative Endo/Other ROS                    Abdominal:             Vascular: Other Findings:           Anesthesia Plan      general     ASA 2     (I discussed with the patient the risks and benefits of PIV, general anesthesia, IV Narcotics, PACU. All questions were answered the patient agrees with the plan and wishes to proceed.  )  Induction: intravenous.                             Kenneth Ramos MD   8/26/2022 see ambulance record
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
## (undated) DEVICE — PADDING UNDERCAST W4INXL4YD 100% COT CRIMPED FINISH WBRL II

## (undated) DEVICE — DRESSING,GAUZE,XEROFORM,CURAD,1"X8",ST: Brand: CURAD

## (undated) DEVICE — SUTURE VCRL SZ 3-0 L18IN ABSRB UD L26MM SH 1/2 CIR J864D

## (undated) DEVICE — SOLUTION IV IRRIG 500ML 0.9% SODIUM CHL 2F7123

## (undated) DEVICE — ELECTRODE PT RET AD L9FT HI MOIST COND ADH HYDRGEL CORDED

## (undated) DEVICE — TIP SUCT DIA12FR W STYL CTRL VENT DISPOSABLE FRAZ

## (undated) DEVICE — PACK ORTH LO EXT VI

## (undated) DEVICE — SUTURE VCRL + SZ 2-0 L18IN ABSRB UD CT1 L36MM 1/2 CIR VCP839D

## (undated) DEVICE — NEEDLE HYPO 25GA L1.5IN BLU POLYPR HUB S STL REG BVL STR

## (undated) DEVICE — MAJOR SET UP PK

## (undated) DEVICE — STOCKINETTE,IMPERVIOUS,12X48,STERILE: Brand: MEDLINE

## (undated) DEVICE — APPLICATOR MEDICATED 26 CC SOLUTION HI LT ORNG CHLORAPREP

## (undated) DEVICE — SYRINGE MED 10ML LUERLOCK TIP W/O SFTY DISP

## (undated) DEVICE — BANDAGE COMPR W4INXL15FT BGE E SGL LAYERED CLP CLSR

## (undated) DEVICE — 3M™ COBAN™ NL STERILE NON-LATEX SELF-ADHERENT WRAP, 2084S, 4 IN X 5 YD (10 CM X 4,5 M), 18 ROLLS/CASE: Brand: 3M™ COBAN™

## (undated) DEVICE — ESMARK: Brand: DEROYAL

## (undated) DEVICE — GAUZE,SPONGE,4"X4",8PLY,STRL,LF,10/TRAY: Brand: MEDLINE

## (undated) DEVICE — Device

## (undated) DEVICE — GLOVE ORANGE PI 7 1/2   MSG9075

## (undated) DEVICE — PADDING CAST W6INXL4YD NONSTERILE COT RAYON MICROPLEATED

## (undated) DEVICE — SUTURE VCRL + SZ 0 L27IN ABSRB UD L36MM CT-1 1/2 CIR VCPP41D

## (undated) DEVICE — SPLINT ORTH W4XL30IN LAYERED FBRGLS FOAM PD BRTH BK MOLD